# Patient Record
Sex: FEMALE | Race: ASIAN | NOT HISPANIC OR LATINO | URBAN - METROPOLITAN AREA
[De-identification: names, ages, dates, MRNs, and addresses within clinical notes are randomized per-mention and may not be internally consistent; named-entity substitution may affect disease eponyms.]

---

## 2016-12-28 RX ORDER — SODIUM CHLORIDE 9 MG/ML
1000 INJECTION, SOLUTION INTRAVENOUS
Qty: 0 | Refills: 0 | Status: DISCONTINUED | OUTPATIENT
Start: 2017-01-17 | End: 2017-01-17

## 2017-01-17 ENCOUNTER — OUTPATIENT (OUTPATIENT)
Dept: OUTPATIENT SERVICES | Facility: HOSPITAL | Age: 22
LOS: 1 days | Discharge: HOME | End: 2017-01-17

## 2017-01-17 VITALS
OXYGEN SATURATION: 98 % | WEIGHT: 106.04 LBS | SYSTOLIC BLOOD PRESSURE: 103 MMHG | HEART RATE: 55 BPM | DIASTOLIC BLOOD PRESSURE: 52 MMHG | HEIGHT: 64 IN | RESPIRATION RATE: 14 BRPM | TEMPERATURE: 98 F

## 2017-01-17 VITALS
HEART RATE: 55 BPM | RESPIRATION RATE: 17 BRPM | TEMPERATURE: 97 F | SYSTOLIC BLOOD PRESSURE: 110 MMHG | OXYGEN SATURATION: 97 % | DIASTOLIC BLOOD PRESSURE: 49 MMHG

## 2017-01-17 DIAGNOSIS — Q21.1 ATRIAL SEPTAL DEFECT: Chronic | ICD-10-CM

## 2017-01-17 DIAGNOSIS — Q89.9 CONGENITAL MALFORMATION, UNSPECIFIED: Chronic | ICD-10-CM

## 2017-01-17 DIAGNOSIS — N89.5 STRICTURE AND ATRESIA OF VAGINA: ICD-10-CM

## 2017-01-17 DIAGNOSIS — Z98.890 OTHER SPECIFIED POSTPROCEDURAL STATES: Chronic | ICD-10-CM

## 2017-01-17 NOTE — ASU DISCHARGE PLAN (ADULT/PEDIATRIC). - NURSING INSTRUCTIONS
you were given tylenol and toradol an NSAID in the operating room Please do not take any pain medication till after 4pm this afternoon

## 2017-01-19 ENCOUNTER — TRANSCRIPTION ENCOUNTER (OUTPATIENT)
Age: 22
End: 2017-01-19

## 2017-02-02 ENCOUNTER — APPOINTMENT (OUTPATIENT)
Dept: ALLERGY | Facility: CLINIC | Age: 22
End: 2017-02-02

## 2017-02-02 VITALS
WEIGHT: 113 LBS | HEIGHT: 64 IN | BODY MASS INDEX: 19.29 KG/M2 | HEART RATE: 68 BPM | RESPIRATION RATE: 14 BRPM | SYSTOLIC BLOOD PRESSURE: 110 MMHG | DIASTOLIC BLOOD PRESSURE: 80 MMHG

## 2017-02-07 ENCOUNTER — RECORD ABSTRACTING (OUTPATIENT)
Age: 22
End: 2017-02-07

## 2017-05-12 ENCOUNTER — OTHER (OUTPATIENT)
Age: 22
End: 2017-05-12

## 2017-05-30 ENCOUNTER — APPOINTMENT (OUTPATIENT)
Dept: PEDIATRIC SURGERY | Facility: CLINIC | Age: 22
End: 2017-05-30

## 2017-05-30 VITALS — WEIGHT: 119.71 LBS

## 2017-10-12 ENCOUNTER — APPOINTMENT (OUTPATIENT)
Dept: PEDIATRIC SURGERY | Facility: CLINIC | Age: 22
End: 2017-10-12
Payer: COMMERCIAL

## 2017-10-12 VITALS — WEIGHT: 130.73 LBS

## 2017-10-12 PROCEDURE — 99215 OFFICE O/P EST HI 40 MIN: CPT

## 2018-02-08 ENCOUNTER — APPOINTMENT (OUTPATIENT)
Dept: PEDIATRIC SURGERY | Facility: CLINIC | Age: 23
End: 2018-02-08
Payer: COMMERCIAL

## 2018-02-08 PROCEDURE — 99213 OFFICE O/P EST LOW 20 MIN: CPT

## 2018-08-21 ENCOUNTER — APPOINTMENT (OUTPATIENT)
Dept: INTERNAL MEDICINE | Facility: CLINIC | Age: 23
End: 2018-08-21
Payer: COMMERCIAL

## 2018-08-21 VITALS
HEIGHT: 64 IN | SYSTOLIC BLOOD PRESSURE: 100 MMHG | WEIGHT: 131 LBS | HEART RATE: 69 BPM | DIASTOLIC BLOOD PRESSURE: 60 MMHG | BODY MASS INDEX: 22.36 KG/M2 | OXYGEN SATURATION: 98 %

## 2018-08-21 DIAGNOSIS — Z86.19 PERSONAL HISTORY OF OTHER INFECTIOUS AND PARASITIC DISEASES: ICD-10-CM

## 2018-08-21 DIAGNOSIS — Z02.82 ENCOUNTER FOR ADOPTION SERVICES: ICD-10-CM

## 2018-08-21 DIAGNOSIS — Z87.2 PERSONAL HISTORY OF DISEASES OF THE SKIN AND SUBCUTANEOUS TISSUE: ICD-10-CM

## 2018-08-21 PROCEDURE — 99385 PREV VISIT NEW AGE 18-39: CPT

## 2018-08-21 RX ORDER — ATOMOXETINE HYDROCHLORIDE 100 MG/1
CAPSULE ORAL
Refills: 0 | Status: DISCONTINUED | COMMUNITY
End: 2018-08-21

## 2018-08-21 RX ORDER — HYDROXYZINE HYDROCHLORIDE 10 MG/1
10 TABLET ORAL
Qty: 30 | Refills: 0 | Status: COMPLETED | COMMUNITY
Start: 2018-03-16

## 2018-08-21 RX ORDER — BETAMETHASONE DIPROPIONATE 0.5 MG/G
0.05 CREAM, AUGMENTED TOPICAL TWICE DAILY
Qty: 60 | Refills: 1 | Status: DISCONTINUED | COMMUNITY
Start: 2017-02-02 | End: 2018-08-21

## 2018-08-21 RX ORDER — ELUXADOLINE 75 MG/1
75 TABLET, FILM COATED ORAL
Qty: 60 | Refills: 0 | Status: DISCONTINUED | COMMUNITY
Start: 2018-04-05

## 2018-08-21 RX ORDER — METOPROLOL SUCCINATE 100 MG/1
TABLET, EXTENDED RELEASE ORAL
Refills: 0 | Status: DISCONTINUED | COMMUNITY
End: 2018-08-21

## 2018-08-21 RX ORDER — HYOSCYAMINE SULFATE 0.12 MG/1
0.12 TABLET SUBLINGUAL
Qty: 60 | Refills: 0 | Status: COMPLETED | COMMUNITY
Start: 2018-04-30

## 2018-08-21 NOTE — HEALTH RISK ASSESSMENT
[No falls in past year] : Patient reported no falls in the past year [0] : 2) Feeling down, depressed, or hopeless: Not at all (0) [[id number]] : 0

## 2018-08-21 NOTE — PLAN
[FreeTextEntry1] : \par Ms. 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 ? IBS: recommend follow up with GI - pt likely needs to try varying regimens of Lomotil till she finds a good balance; recommend close flollow up Pt counseled to call MD if new symptoms develop or worsen. check CMP and TSH today\par \par 2. Congenital Heart Disease: followup with Cardiology: Dr. Aval[facility] at [facility]; no acute concerns; check lipids today; obtain copy of records from cardiology\par \par 3. STI Screening : HIV, Hep C/B, Syphilis, GC/Chlamydia today

## 2018-08-21 NOTE — COUNSELING
[Behavioral health counseling provided] : behavioral health  [Engage in a relaxing activity] : Engage in a relaxing activity [Patient motivation] : Patient motivation [Needs reinforcement, provided] : Patient needs reinforcement on understanding lifestyle changes and  the steps needed to achieve self management goals and reinforcement was provided

## 2018-08-21 NOTE — HISTORY OF PRESENT ILLNESS
[FreeTextEntry1] : CPE [de-identified] : \par Ms. 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 May - [place]\par Started on Lomotil for ? IBS - April 2018 - q8hr became constipated ; has tried q12h but returned to having diarrhea immense ; going back to q8hr caused constipation all day ; \par \par Cardiology: Dr. Avalos at [facility]; \par Recent chest pain - [facility] [place] - was told that she should work out more and eat better for chest pain; Cardiac MRI performed and EKG performed - within baseline; ? anxiety related as per cardiologist\par Today, Denies: chest pain, palpitations, headaches, shortness of breath (w/ or w/o exertion), vision or hearing changes, peripheral edema \par \par occupation: + ; volunteers at [employer]\par household: parents 2 brothers (1 with Asperger's) 2 dogs - american eskimo + mutt\par has a BF - not sexually active by penetration; is undergoing dilation with GYN however conflicted about it\par \par

## 2018-08-21 NOTE — PHYSICAL EXAM
[Normal] : normoactive bowel sounds, soft and nontender, no hepatosplenomegaly or masses appreciated [de-identified] : + chest scar from previous surgeries; slight murmur to RUSB

## 2018-08-22 ENCOUNTER — TRANSCRIPTION ENCOUNTER (OUTPATIENT)
Age: 23
End: 2018-08-22

## 2018-08-22 LAB
ALBUMIN SERPL ELPH-MCNC: 4.3 G/DL
ALP BLD-CCNC: 70 U/L
ALT SERPL-CCNC: <5 U/L
ANION GAP SERPL CALC-SCNC: 13 MMOL/L
AST SERPL-CCNC: 14 U/L
BILIRUB SERPL-MCNC: 0.5 MG/DL
BUN SERPL-MCNC: 11 MG/DL
C TRACH RRNA SPEC QL NAA+PROBE: NOT DETECTED
CALCIUM SERPL-MCNC: 9.6 MG/DL
CHLORIDE SERPL-SCNC: 103 MMOL/L
CHOLEST SERPL-MCNC: 177 MG/DL
CHOLEST/HDLC SERPL: 3.5 RATIO
CO2 SERPL-SCNC: 23 MMOL/L
CREAT SERPL-MCNC: 0.64 MG/DL
GLUCOSE SERPL-MCNC: 123 MG/DL
HBV SURFACE AB SER QL: NONREACTIVE
HBV SURFACE AG SER QL: NONREACTIVE
HCV AB SER QL: NONREACTIVE
HCV S/CO RATIO: 0.24 S/CO
HDLC SERPL-MCNC: 50 MG/DL
HIV1+2 AB SPEC QL IA.RAPID: NONREACTIVE
LDLC SERPL CALC-MCNC: 113 MG/DL
N GONORRHOEA RRNA SPEC QL NAA+PROBE: NOT DETECTED
POTASSIUM SERPL-SCNC: 3.9 MMOL/L
PROT SERPL-MCNC: 7.3 G/DL
SODIUM SERPL-SCNC: 139 MMOL/L
SOURCE AMPLIFICATION: NORMAL
T PALLIDUM AB SER QL IA: NEGATIVE
TRIGL SERPL-MCNC: 71 MG/DL
TSH SERPL-ACNC: 3.37 UIU/ML

## 2018-09-04 ENCOUNTER — TRANSCRIPTION ENCOUNTER (OUTPATIENT)
Age: 23
End: 2018-09-04

## 2018-12-12 LAB
M TB IFN-G BLD-IMP: NEGATIVE
QUANTIFERON TB PLUS MITOGEN MINUS NIL: 2.61 IU/ML
QUANTIFERON TB PLUS NIL: 0.02 IU/ML
QUANTIFERON TB PLUS TB1 MINUS NIL: 0.02 IU/ML
QUANTIFERON TB PLUS TB2 MINUS NIL: 0.03 IU/ML

## 2019-04-22 ENCOUNTER — FORM ENCOUNTER (OUTPATIENT)
Age: 24
End: 2019-04-22

## 2019-04-23 ENCOUNTER — OUTPATIENT (OUTPATIENT)
Dept: OUTPATIENT SERVICES | Facility: HOSPITAL | Age: 24
LOS: 1 days | End: 2019-04-23
Payer: COMMERCIAL

## 2019-04-23 ENCOUNTER — APPOINTMENT (OUTPATIENT)
Dept: RADIOLOGY | Facility: CLINIC | Age: 24
End: 2019-04-23
Payer: COMMERCIAL

## 2019-04-23 ENCOUNTER — APPOINTMENT (OUTPATIENT)
Dept: INTERNAL MEDICINE | Facility: CLINIC | Age: 24
End: 2019-04-23
Payer: COMMERCIAL

## 2019-04-23 VITALS
WEIGHT: 134 LBS | TEMPERATURE: 209.48 F | HEIGHT: 64 IN | SYSTOLIC BLOOD PRESSURE: 102 MMHG | OXYGEN SATURATION: 97 % | BODY MASS INDEX: 22.88 KG/M2 | DIASTOLIC BLOOD PRESSURE: 60 MMHG | HEART RATE: 68 BPM

## 2019-04-23 DIAGNOSIS — Z86.59 PERSONAL HISTORY OF OTHER MENTAL AND BEHAVIORAL DISORDERS: ICD-10-CM

## 2019-04-23 DIAGNOSIS — Q89.9 CONGENITAL MALFORMATION, UNSPECIFIED: Chronic | ICD-10-CM

## 2019-04-23 DIAGNOSIS — Z98.890 OTHER SPECIFIED POSTPROCEDURAL STATES: Chronic | ICD-10-CM

## 2019-04-23 DIAGNOSIS — J20.9 ACUTE BRONCHITIS, UNSPECIFIED: ICD-10-CM

## 2019-04-23 DIAGNOSIS — Q21.1 ATRIAL SEPTAL DEFECT: Chronic | ICD-10-CM

## 2019-04-23 PROCEDURE — 99215 OFFICE O/P EST HI 40 MIN: CPT

## 2019-04-23 PROCEDURE — 71046 X-RAY EXAM CHEST 2 VIEWS: CPT

## 2019-04-23 PROCEDURE — 71046 X-RAY EXAM CHEST 2 VIEWS: CPT | Mod: 26

## 2019-04-26 LAB
ALBUMIN SERPL ELPH-MCNC: 4.5 G/DL
ALP BLD-CCNC: 76 U/L
ALT SERPL-CCNC: 9 U/L
ANION GAP SERPL CALC-SCNC: 14 MMOL/L
AST SERPL-CCNC: 18 U/L
BASOPHILS # BLD AUTO: 0.03 K/UL
BASOPHILS NFR BLD AUTO: 0.5 %
BILIRUB SERPL-MCNC: 0.4 MG/DL
BUN SERPL-MCNC: 7 MG/DL
CALCIUM SERPL-MCNC: 9.6 MG/DL
CHLORIDE SERPL-SCNC: 108 MMOL/L
CMV IGG SERPL QL: <0.2 U/ML
CMV IGG SERPL-IMP: NEGATIVE
CO2 SERPL-SCNC: 22 MMOL/L
CREAT SERPL-MCNC: 0.69 MG/DL
EOSINOPHIL # BLD AUTO: 0.1 K/UL
EOSINOPHIL NFR BLD AUTO: 1.5 %
GLUCOSE SERPL-MCNC: 103 MG/DL
HCT VFR BLD CALC: 39.2 %
HETEROPH AB SER QL: NEGATIVE
HETEROPH AB SER QL: NEGATIVE
HGB BLD-MCNC: 12.6 G/DL
IMM GRANULOCYTES NFR BLD AUTO: 0.2 %
LYMPHOCYTES # BLD AUTO: 1.19 K/UL
LYMPHOCYTES NFR BLD AUTO: 17.9 %
MAN DIFF?: NORMAL
MCHC RBC-ENTMCNC: 28 PG
MCHC RBC-ENTMCNC: 32.1 GM/DL
MCV RBC AUTO: 87.1 FL
MONOCYTES # BLD AUTO: 0.41 K/UL
MONOCYTES NFR BLD AUTO: 6.2 %
NEUTROPHILS # BLD AUTO: 4.92 K/UL
NEUTROPHILS NFR BLD AUTO: 73.7 %
PLATELET # BLD AUTO: 287 K/UL
POTASSIUM SERPL-SCNC: 4.5 MMOL/L
PROT SERPL-MCNC: 7.1 G/DL
RBC # BLD: 4.5 M/UL
RBC # FLD: 12.2 %
SODIUM SERPL-SCNC: 144 MMOL/L
WBC # FLD AUTO: 6.66 K/UL

## 2019-04-26 NOTE — HISTORY OF PRESENT ILLNESS
[FreeTextEntry8] : \par Ms. Flor presents for acute symptoms:\par \par cough x 3 weeks - started off with normal URI like symptoms - after a stressful day 3 weeks ago; sore throat, painful swallowing; \par went to urgent care - was told that she did not have strep throat or influenza; started on Amoxicillin + Clauv Acid; \par Associated symptoms: migraines, "fogginess" , neck pain, nasal congestion, wheezing intermittent, myalgias, fatigue, some diarrhea with antibiotic use, near syncope; has multiple episodes of diarrhea and near accidents due to frequency; denies melena or hematochezia\par \par Prev hx of Mono - at the age of 12\par \par Denies fevers, chills, shortness of breath, chest pain, peripheral edema;\par OTC: Mucinex + Advil for pain relief\par \par PO Intake: some, able to tolerate PO intake

## 2019-04-26 NOTE — PLAN
[FreeTextEntry1] : \par \par \par Ms. Flor presents for acute symptoms:\par \par 1. Cough/Fatigue/URI: concerned about bronchitis vs. mono vs. CMV - check serologies and CXR to r/o PNA; [facility]; STOP abx. \par \par 2. GI: STOP abx ; start probiotics; try low FODMAP diet with small frequent meals; \par \par 3. RTO in 2 weeks or sooner - Pt counseled to call MD if new symptoms develop or worsen. \par \par 4. Infertility: endocrinology referral provided; pt is concerned about family planning for the future and finds it difficult sometimes to keep optomistic.

## 2019-04-26 NOTE — PHYSICAL EXAM
[Thin] : thin [Normal] : normal rate, regular rhythm, normal S1/S2, no murmur [Ulcer] : no ulcer [Mucositis] : no mucositis  [Thrush] : no thrush [de-identified] : erythema to posterior pharynx [de-identified] : mild wheezing to bases but otherwise good air movement no rhonchi

## 2019-07-24 ENCOUNTER — TRANSCRIPTION ENCOUNTER (OUTPATIENT)
Age: 24
End: 2019-07-24

## 2019-08-09 ENCOUNTER — APPOINTMENT (OUTPATIENT)
Dept: ENDOCRINOLOGY | Facility: CLINIC | Age: 24
End: 2019-08-09
Payer: COMMERCIAL

## 2019-08-09 VITALS
WEIGHT: 128 LBS | BODY MASS INDEX: 21.97 KG/M2 | OXYGEN SATURATION: 99 % | SYSTOLIC BLOOD PRESSURE: 88 MMHG | HEART RATE: 48 BPM | DIASTOLIC BLOOD PRESSURE: 42 MMHG

## 2019-08-09 PROCEDURE — 99204 OFFICE O/P NEW MOD 45 MIN: CPT

## 2019-08-26 ENCOUNTER — LABORATORY RESULT (OUTPATIENT)
Age: 24
End: 2019-08-26

## 2019-08-29 ENCOUNTER — TRANSCRIPTION ENCOUNTER (OUTPATIENT)
Age: 24
End: 2019-08-29

## 2019-09-11 ENCOUNTER — APPOINTMENT (OUTPATIENT)
Dept: ULTRASOUND IMAGING | Facility: CLINIC | Age: 24
End: 2019-09-11
Payer: COMMERCIAL

## 2019-09-11 ENCOUNTER — OUTPATIENT (OUTPATIENT)
Dept: OUTPATIENT SERVICES | Facility: HOSPITAL | Age: 24
LOS: 1 days | End: 2019-09-11
Payer: COMMERCIAL

## 2019-09-11 ENCOUNTER — OTHER (OUTPATIENT)
Age: 24
End: 2019-09-11

## 2019-09-11 DIAGNOSIS — Q89.9 CONGENITAL MALFORMATION, UNSPECIFIED: Chronic | ICD-10-CM

## 2019-09-11 DIAGNOSIS — Z00.8 ENCOUNTER FOR OTHER GENERAL EXAMINATION: ICD-10-CM

## 2019-09-11 DIAGNOSIS — Z98.890 OTHER SPECIFIED POSTPROCEDURAL STATES: Chronic | ICD-10-CM

## 2019-09-11 DIAGNOSIS — Q21.1 ATRIAL SEPTAL DEFECT: Chronic | ICD-10-CM

## 2019-09-11 LAB
17OHP SERPL-MCNC: 98 NG/DL
25(OH)D3 SERPL-MCNC: 27.4 NG/ML
ACTH SER-ACNC: 69.4 PG/ML
ANDROST SERPL-MCNC: 252 NG/DL
ANION GAP SERPL CALC-SCNC: 15 MMOL/L
BUN SERPL-MCNC: 12 MG/DL
CALCIUM SERPL-MCNC: 9.8 MG/DL
CALCIUM SERPL-MCNC: 9.8 MG/DL
CHLORIDE SERPL-SCNC: 104 MMOL/L
CO2 SERPL-SCNC: 22 MMOL/L
CORTIS SERPL-MCNC: 16.1 UG/DL
CREAT SERPL-MCNC: 0.67 MG/DL
DHEA-SULFATE, SERUM: 161 UG/DL
ESTRADIOL SERPL-MCNC: 55 PG/ML
FSH SERPL-MCNC: 5.4 IU/L
GH SERPL-MCNC: 0.07 NG/ML
GLUCOSE SERPL-MCNC: 91 MG/DL
IGF-1 INTERP: NORMAL
IGF-I BLD-MCNC: 203 NG/ML
LH SERPL-ACNC: 16.7 IU/L
PARATHYROID HORMONE INTACT: 27 PG/ML
POTASSIUM SERPL-SCNC: 4.5 MMOL/L
PROLACTIN SERPL-MCNC: 37.3 NG/ML
SODIUM SERPL-SCNC: 141 MMOL/L
T3RU NFR SERPL: 1 TBI
T4 FREE SERPL-MCNC: 1.1 NG/DL
T4 SERPL-MCNC: 6.3 UG/DL
TSH SERPL-ACNC: 3.52 UIU/ML

## 2019-09-11 PROCEDURE — 76856 US EXAM PELVIC COMPLETE: CPT

## 2019-09-11 PROCEDURE — 76856 US EXAM PELVIC COMPLETE: CPT | Mod: 26

## 2019-09-19 ENCOUNTER — OTHER (OUTPATIENT)
Age: 24
End: 2019-09-19

## 2019-09-19 LAB
17OHP SERPL-MCNC: 133 NG/DL
ACTH SER-ACNC: 70.3 PG/ML
ALDOSTERONE SERUM: 13.8 NG/DL
ANDROST SERPL-MCNC: 243 NG/DL
CORTIS SERPL-MCNC: 19.5 UG/DL
DHEA-SULFATE, SERUM: 202 UG/DL
ESTRADIOL SERPL-MCNC: 125 PG/ML
FSH SERPL-MCNC: 2.9 IU/L
LH SERPL-ACNC: 10.5 IU/L
PROLACTIN SERPL-MCNC: 40.6 NG/ML
RENIN ACTIVITY, PLASMA: 0.58 NG/ML/HR
SHBG SERPL-SCNC: 45 NMOL/L
TESTOST BND SERPL-MCNC: 2 PG/ML
TESTOST SERPL-MCNC: 25.4 NG/DL

## 2019-09-20 LAB — ANDROSTANOLONE SERPL-MCNC: 7.5 NG/DL

## 2019-09-23 ENCOUNTER — APPOINTMENT (OUTPATIENT)
Dept: PEDIATRIC MEDICAL GENETICS | Facility: CLINIC | Age: 24
End: 2019-09-23
Payer: COMMERCIAL

## 2019-09-23 VITALS — HEIGHT: 64.57 IN | WEIGHT: 127.87 LBS | BODY MASS INDEX: 21.56 KG/M2

## 2019-09-23 LAB
MONOMERIC PROLACTIN (ICMA)*: 31 NG/ML
PERCENT MACROPROLACTIN: 34 %
PROLACTIN, SERUM (ICMA)*: 47 NG/ML

## 2019-09-23 PROCEDURE — ZZZZZ: CPT

## 2019-09-23 PROCEDURE — 36415 COLL VENOUS BLD VENIPUNCTURE: CPT

## 2019-09-23 PROCEDURE — 99244 OFF/OP CNSLTJ NEW/EST MOD 40: CPT

## 2019-10-04 LAB — HIGH RESOLUTION CHROMOSOMAL MICROARRAY: NORMAL

## 2019-10-28 ENCOUNTER — TRANSCRIPTION ENCOUNTER (OUTPATIENT)
Age: 24
End: 2019-10-28

## 2019-10-30 ENCOUNTER — OTHER (OUTPATIENT)
Age: 24
End: 2019-10-30

## 2019-10-31 ENCOUNTER — APPOINTMENT (OUTPATIENT)
Dept: INTERNAL MEDICINE | Facility: CLINIC | Age: 24
End: 2019-10-31
Payer: COMMERCIAL

## 2019-10-31 VITALS
TEMPERATURE: 208.94 F | HEIGHT: 64.57 IN | BODY MASS INDEX: 22.43 KG/M2 | OXYGEN SATURATION: 98 % | HEART RATE: 74 BPM | DIASTOLIC BLOOD PRESSURE: 62 MMHG | WEIGHT: 133 LBS | SYSTOLIC BLOOD PRESSURE: 104 MMHG

## 2019-10-31 PROCEDURE — 99395 PREV VISIT EST AGE 18-39: CPT

## 2019-10-31 RX ORDER — ALBUTEROL SULFATE 90 UG/1
108 (90 BASE) AEROSOL, METERED RESPIRATORY (INHALATION)
Qty: 1 | Refills: 3 | Status: DISCONTINUED | COMMUNITY
Start: 2019-04-23 | End: 2019-10-31

## 2019-10-31 RX ORDER — SACCHAROMYCES BOULARDII 50 MG
250 CAPSULE ORAL TWICE DAILY
Qty: 16 | Refills: 0 | Status: DISCONTINUED | COMMUNITY
Start: 2019-04-23 | End: 2019-10-31

## 2019-10-31 RX ORDER — METOPROLOL SUCCINATE 25 MG/1
25 TABLET, EXTENDED RELEASE ORAL
Qty: 45 | Refills: 0 | Status: DISCONTINUED | COMMUNITY
Start: 2018-08-03 | End: 2019-10-31

## 2019-10-31 NOTE — HISTORY OF PRESENT ILLNESS
[FreeTextEntry1] : cpe\par forms for work, new job [de-identified] : 23 yo woman for wellness exam\par h/o congen heart ds s/p repair, cleft palate,  Mullerian anomaly-cloaca,( imperf anus/uterine and vag  agenesis)\par s/p sev surg/procedures over her lifetime starting as infant\par -currently has colostomy  (catheter tube,  not bag) ;vag  opening  was created from colonic tissue, previous discussions re  dilating.\par Gyn Dr. 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

## 2019-10-31 NOTE — ASSESSMENT
[FreeTextEntry1] : Young woman as a ambrocio.\par Overall, doing well.\par Encouraged to exercise more and eat healthy diet.\par check routine labs and quant gold (neg last yr)\par form preliminarily filled out.

## 2019-10-31 NOTE — PHYSICAL EXAM
[No Acute Distress] : no acute distress [Well Nourished] : well nourished [Well Developed] : well developed [Well-Appearing] : well-appearing [Normal Sclera/Conjunctiva] : normal sclera/conjunctiva [PERRL] : pupils equal round and reactive to light [EOMI] : extraocular movements intact [Normal Outer Ear/Nose] : the outer ears and nose were normal in appearance [Normal Oropharynx] : the oropharynx was normal [No JVD] : no jugular venous distention [No Lymphadenopathy] : no lymphadenopathy [Supple] : supple [Thyroid Normal, No Nodules] : the thyroid was normal and there were no nodules present [No Respiratory Distress] : no respiratory distress  [No Accessory Muscle Use] : no accessory muscle use [Clear to Auscultation] : lungs were clear to auscultation bilaterally [Normal Rate] : normal rate  [Regular Rhythm] : with a regular rhythm [Normal S1, S2] : normal S1 and S2 [No Murmur] : no murmur heard [No Carotid Bruits] : no carotid bruits [No Abdominal Bruit] : a ~M bruit was not heard ~T in the abdomen [No Varicosities] : no varicosities [Pedal Pulses Present] : the pedal pulses are present [No Edema] : there was no peripheral edema [No Palpable Aorta] : no palpable aorta [No Extremity Clubbing/Cyanosis] : no extremity clubbing/cyanosis [Normal Appearance] : normal in appearance [No Axillary Lymphadenopathy] : no axillary lymphadenopathy [Soft] : abdomen soft [Non Tender] : non-tender [Non-distended] : non-distended [No Masses] : no abdominal mass palpated [No HSM] : no HSM [Normal Bowel Sounds] : normal bowel sounds [Normal Posterior Cervical Nodes] : no posterior cervical lymphadenopathy [Normal Anterior Cervical Nodes] : no anterior cervical lymphadenopathy [No CVA Tenderness] : no CVA  tenderness [No Spinal Tenderness] : no spinal tenderness [No Joint Swelling] : no joint swelling [Grossly Normal Strength/Tone] : grossly normal strength/tone [No Rash] : no rash [Coordination Grossly Intact] : coordination grossly intact [No Focal Deficits] : no focal deficits [Normal Gait] : normal gait [Deep Tendon Reflexes (DTR)] : deep tendon reflexes were 2+ and symmetric [Normal Affect] : the affect was normal [Normal Insight/Judgement] : insight and judgment were intact [de-identified] : midline scar s/p cardiac surg [de-identified] : catheter emanating from umbilicus w port, taped onto abdo

## 2019-11-03 ENCOUNTER — TRANSCRIPTION ENCOUNTER (OUTPATIENT)
Age: 24
End: 2019-11-03

## 2019-11-03 LAB
ALBUMIN SERPL ELPH-MCNC: 5.2 G/DL
ALP BLD-CCNC: 75 U/L
ALT SERPL-CCNC: 11 U/L
ANION GAP SERPL CALC-SCNC: 14 MMOL/L
AST SERPL-CCNC: 15 U/L
BASOPHILS # BLD AUTO: 0.02 K/UL
BASOPHILS NFR BLD AUTO: 0.3 %
BILIRUB SERPL-MCNC: 0.4 MG/DL
BUN SERPL-MCNC: 13 MG/DL
C TRACH RRNA SPEC QL NAA+PROBE: NOT DETECTED
CALCIUM SERPL-MCNC: 10.2 MG/DL
CHLORIDE SERPL-SCNC: 103 MMOL/L
CHOLEST SERPL-MCNC: 201 MG/DL
CHOLEST/HDLC SERPL: 3.4 RATIO
CO2 SERPL-SCNC: 23 MMOL/L
CREAT SERPL-MCNC: 0.58 MG/DL
EOSINOPHIL # BLD AUTO: 0.08 K/UL
EOSINOPHIL NFR BLD AUTO: 1.2 %
ESTIMATED AVERAGE GLUCOSE: 100 MG/DL
GLUCOSE SERPL-MCNC: 88 MG/DL
HBA1C MFR BLD HPLC: 5.1 %
HBV SURFACE AB SER QL: NONREACTIVE
HBV SURFACE AG SER QL: NONREACTIVE
HCT VFR BLD CALC: 41.4 %
HCV AB SER QL: NONREACTIVE
HCV S/CO RATIO: 0.21 S/CO
HDLC SERPL-MCNC: 60 MG/DL
HGB BLD-MCNC: 13 G/DL
HIV1+2 AB SPEC QL IA.RAPID: NONREACTIVE
IMM GRANULOCYTES NFR BLD AUTO: 0.3 %
LDLC SERPL CALC-MCNC: 126 MG/DL
LYMPHOCYTES # BLD AUTO: 1.84 K/UL
LYMPHOCYTES NFR BLD AUTO: 27.4 %
M TB IFN-G BLD-IMP: NEGATIVE
MAN DIFF?: NORMAL
MCHC RBC-ENTMCNC: 28.4 PG
MCHC RBC-ENTMCNC: 31.4 GM/DL
MCV RBC AUTO: 90.6 FL
MONOCYTES # BLD AUTO: 0.48 K/UL
MONOCYTES NFR BLD AUTO: 7.1 %
N GONORRHOEA RRNA SPEC QL NAA+PROBE: NOT DETECTED
NEUTROPHILS # BLD AUTO: 4.28 K/UL
NEUTROPHILS NFR BLD AUTO: 63.7 %
PLATELET # BLD AUTO: 283 K/UL
POTASSIUM SERPL-SCNC: 4.5 MMOL/L
PROT SERPL-MCNC: 7.6 G/DL
QUANTIFERON TB PLUS MITOGEN MINUS NIL: 8.13 IU/ML
QUANTIFERON TB PLUS NIL: 0.04 IU/ML
QUANTIFERON TB PLUS TB1 MINUS NIL: 0.02 IU/ML
QUANTIFERON TB PLUS TB2 MINUS NIL: 0.03 IU/ML
RBC # BLD: 4.57 M/UL
RBC # FLD: 12.6 %
SODIUM SERPL-SCNC: 140 MMOL/L
SOURCE AMPLIFICATION: NORMAL
T PALLIDUM AB SER QL IA: NEGATIVE
TRIGL SERPL-MCNC: 73 MG/DL
TSH SERPL-ACNC: 3.67 UIU/ML
WBC # FLD AUTO: 6.72 K/UL

## 2019-11-25 ENCOUNTER — APPOINTMENT (OUTPATIENT)
Dept: MRI IMAGING | Facility: CLINIC | Age: 24
End: 2019-11-25
Payer: COMMERCIAL

## 2019-11-25 ENCOUNTER — OUTPATIENT (OUTPATIENT)
Dept: OUTPATIENT SERVICES | Facility: HOSPITAL | Age: 24
LOS: 1 days | End: 2019-11-25
Payer: COMMERCIAL

## 2019-11-25 DIAGNOSIS — Q89.9 CONGENITAL MALFORMATION, UNSPECIFIED: Chronic | ICD-10-CM

## 2019-11-25 DIAGNOSIS — Z98.890 OTHER SPECIFIED POSTPROCEDURAL STATES: Chronic | ICD-10-CM

## 2019-11-25 DIAGNOSIS — Q21.1 ATRIAL SEPTAL DEFECT: Chronic | ICD-10-CM

## 2019-11-25 DIAGNOSIS — Z00.8 ENCOUNTER FOR OTHER GENERAL EXAMINATION: ICD-10-CM

## 2019-11-25 PROCEDURE — 70553 MRI BRAIN STEM W/O & W/DYE: CPT

## 2019-11-25 PROCEDURE — A9585: CPT

## 2019-11-25 PROCEDURE — 70553 MRI BRAIN STEM W/O & W/DYE: CPT | Mod: 26

## 2019-12-04 ENCOUNTER — APPOINTMENT (OUTPATIENT)
Dept: INTERNAL MEDICINE | Facility: CLINIC | Age: 24
End: 2019-12-04

## 2019-12-09 ENCOUNTER — APPOINTMENT (OUTPATIENT)
Dept: ENDOCRINOLOGY | Facility: CLINIC | Age: 24
End: 2019-12-09

## 2019-12-16 NOTE — HISTORY OF PRESENT ILLNESS
[FreeTextEntry1] : Sandi is a 24 year old female with multiple birth defects including a congenital heart defect (ASD, VSD, PDA and coarctation of the aorta), imperforate anus, cleft palate and uterine agenesis.  Her heart defect and imperforate anus were surgically corrected in [place] prior to her adoption.  During one of Sandi's surgeries for imperforate anus, she was noted to have an absent uterus.  During another related surgery, she had surgery for the creation of a vaginal ostomy from colonic tissue.   Sandi's cleft palate was first noted when she was 4 years of age. At the time, her brother (who was also adopted) was being seen by a specialist due to his cleft lip.  This specialist happen to notice that Sandi had nasal speech.  She was diagnosed with a soft palate cleft and underwent surgery in July 1999.  \par \par Sandi was recently seen by an Endocrinologist for a work-up due to concern about hormonal mood swings.   She had normal pubertal development but amenorrhea.  An abdominal sonogram revealed normal ovaries.  Her work up was normal except for elevated ACTH and prolactin levels.  She is planning to schedule an MRI of the pituitary gland in the near future.  Sandi has bipolar illness and has attempted suicide on more than one occasion.  She has IBS.  She is followed by a gynecologist who performed vaginal dilation as needed.  She is also followed by her Cardiologist, Dr. Wilburn, on an annual basis.  A recent stress test was normal.\par \par Lane' developmental milestones were normal.  However, she had "significant" learning problems.  She had was in resource room  and had an IEP with smaller classes throughout her education.  She was able to graduate HS and is now taking some college classes.  \par \par

## 2019-12-16 NOTE — FAMILY HISTORY
[FreeTextEntry1] : Sandi's biological mother has 2 sons from another relationship who are reportedly normal. She has met her biological mother on 2 occasions and she appears normal.  No other information is known about the biological family history.  Sandi is of [place] descent.

## 2019-12-16 NOTE — CONSULT LETTER
[Consult Letter:] : I had the pleasure of evaluating your patient, [unfilled]. [Please see my note below.] : Please see my note below. [Consult Closing:] : Thank you very much for allowing me to participate in the care of this patient.  If you have any questions, please do not hesitate to contact me. [Sincerely,] : Sincerely, [DrBerta  ___] : Dr. RODRIGUEZ [Dear  ___] : Dear  [unfilled], [FreeTextEntry2] : Dr. Júnior Andrews, [FreeTextEntry3] : Kirk Park, DO\par Chief, Medical Genetics

## 2019-12-16 NOTE — PHYSICAL EXAM
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

## 2019-12-16 NOTE — BIRTH HISTORY
[FreeTextEntry1] : Sandi was the 2.8 kg product of a 38 week gestation, born by  in [place] to a  mother.  Very little is known about the pregnancy history since Sandi was given up to Foster Care at 2 months of age.  However, her mother is reported to have had anemia during the pregnancy and smoked 2 cigarettes per day.  At birth, Sandi was noted to have an imperforate anus and congenital heart defect (coarctation of aorta, VSD, ASD and PDA).  She had surgery for the imperforate anus shortly after birth and surgery for her heart defect in 1994.  She was eventually released from the hospital into [facility].  She was then adopted in the [place] at 1 year of age .   \par \par

## 2019-12-16 NOTE — REASON FOR VISIT
[Initial - Scheduled] : [unfilled]  is being seen for  ~M an initial scheduled visit [FreeTextEntry3] : she was referred for this initial genetic consultation from her Endocrinologist, Dr. Rafal Andrews for unexplained congenital anomalies (uterine agenesis, imperforate anus, cleft palate, and heart defect)\par \par

## 2019-12-16 NOTE — REVIEW OF SYSTEMS
[Nl] : Neurological [Smokers in Home] : no one in home smokes [FreeTextEntry3] : history of soft palate cleft [FreeTextEntry1] : uterine agensis [FreeTextEntry2] : elevated ACTH and prolactin

## 2019-12-17 ENCOUNTER — APPOINTMENT (OUTPATIENT)
Dept: ENDOCRINOLOGY | Facility: CLINIC | Age: 24
End: 2019-12-17
Payer: COMMERCIAL

## 2019-12-17 VITALS
BODY MASS INDEX: 22.09 KG/M2 | DIASTOLIC BLOOD PRESSURE: 61 MMHG | SYSTOLIC BLOOD PRESSURE: 100 MMHG | WEIGHT: 131 LBS | OXYGEN SATURATION: 98 % | HEART RATE: 76 BPM

## 2019-12-17 PROCEDURE — 99214 OFFICE O/P EST MOD 30 MIN: CPT

## 2019-12-24 LAB
ACTH SER-ACNC: 2.3 PG/ML
CORTIS SERPL-MCNC: 0.4 UG/DL

## 2020-02-25 ENCOUNTER — NON-APPOINTMENT (OUTPATIENT)
Age: 25
End: 2020-02-25

## 2020-02-25 ENCOUNTER — APPOINTMENT (OUTPATIENT)
Dept: OPHTHALMOLOGY | Facility: CLINIC | Age: 25
End: 2020-02-25
Payer: COMMERCIAL

## 2020-02-25 PROCEDURE — 92133 CPTRZD OPH DX IMG PST SGM ON: CPT

## 2020-02-25 PROCEDURE — 92004 COMPRE OPH EXAM NEW PT 1/>: CPT

## 2020-02-25 PROCEDURE — 92083 EXTENDED VISUAL FIELD XM: CPT

## 2020-03-06 ENCOUNTER — TRANSCRIPTION ENCOUNTER (OUTPATIENT)
Age: 25
End: 2020-03-06

## 2020-03-20 ENCOUNTER — APPOINTMENT (OUTPATIENT)
Dept: INTERNAL MEDICINE | Facility: CLINIC | Age: 25
End: 2020-03-20
Payer: COMMERCIAL

## 2020-03-20 PROCEDURE — 99442: CPT

## 2020-03-20 PROCEDURE — G2012 BRIEF CHECK IN BY MD/QHP: CPT

## 2020-04-09 NOTE — HISTORY OF PRESENT ILLNESS
[No] : no difficulty breathing [None] : none [Improved] : improved [Patient advised to contact office if symptoms progress] : Patient advised to contact office if symptoms progress [TextBox_8] : 6 [TextBox_28] : Chest pain have decreased

## 2020-04-09 NOTE — PLAN
[FreeTextEntry1] : \par Recommend temp checks q6hr\par Monitor breathing \par Take Tylenol after checking temperature if needed for muscle aches and fevers\par For cough/ sinus congestion: can take cough suppressant or cepacol lozenges; recommend nasal spray + Claritin / Allegra OTC;\par Maintain quarantine; frequent hand washing and hygiene\par Pt counseled to call MD if new symptoms develop or worsen. \par \par

## 2020-04-17 ENCOUNTER — APPOINTMENT (OUTPATIENT)
Dept: ENDOCRINOLOGY | Facility: CLINIC | Age: 25
End: 2020-04-17
Payer: COMMERCIAL

## 2020-04-17 PROCEDURE — 99214 OFFICE O/P EST MOD 30 MIN: CPT | Mod: 95

## 2020-04-17 NOTE — PHYSICAL EXAM
[Alert] : alert [Well Nourished] : well nourished [Healthy Appearance] : healthy appearance [No Acute Distress] : no acute distress [Well Developed] : well developed [Normal Voice/Communication] : normal voice communication [Normal Sclera/Conjunctiva] : normal sclera/conjunctiva [EOMI] : extra ocular movement intact [No Proptosis] : no proptosis [No Lid Lag] : no lid lag [Normal Outer Ear/Nose] : the ears and nose were normal in appearance [Normal Hearing] : hearing was normal [No Neck Mass] : no neck mass was observed [No Respiratory Distress] : no respiratory distress [No Accessory Muscle Use] : no accessory muscle use [Normal Rate] : heart rate was normal [Not Distended] : not distended [Soft] : abdomen soft [Normal Gait] : normal gait [No Rash] : no rash [No Skin Lesions] : no skin lesions [Cranial Nerves Intact] : cranial nerves 2-12 were intact [No Tremors] : no tremors [Oriented x3] : oriented to person, place, and time [Normal Affect] : the affect was normal [Normal Insight/Judgement] : insight and judgment were intact [Normal Mood] : the mood was normal

## 2020-04-17 NOTE — REVIEW OF SYSTEMS
[Irregular Menses] : irregular menses [As Noted in HPI] : as noted in HPI [Anxiety] : anxiety [Negative] : Heme/Lymph [All other systems negative] : All other systems negative [Fatigue] : no fatigue [Nausea] : no nausea [Constipation] : no constipation [Vomiting] : no vomiting [Diarrhea] : no diarrhea [Polyuria] : no polyuria [Joint Pain] : no joint pain [Muscle Weakness] : no muscle weakness [Myalgia] : no myalgia  [Acanthosis] : no acanthosis  [Hirsutism] : no hirsutism [Headaches] : no headaches

## 2020-04-17 NOTE — DATA REVIEWED
[FreeTextEntry1] : A brief thyroid ultrasound was done in the office 08/09/19 for recent episode of thyroiditis. The thyroid gland is normal in size, homogenous in all lobes with no nodules. No abnormal lymph nodes notes on exam

## 2020-04-17 NOTE — ASSESSMENT
[FreeTextEntry1] : 24 year old female with complex medical history of coarctation of aorta (VSD-ASD-PDA), imperforate anus and hymen s/p vaginoplasty, and appendicostomy, cleft palate closure, congenitally absent uterus here for f/u on hyperprolactinemia and high ACTH. Also with pituitary adenoma.\par \par Amenorrhea\par - Primary amenorrhea due to absence of uterus. 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 Hanson\par - Testosterone and 17-OHP is normal\par - Duhydrotestosterone is normal\par \par \par F/u annually for now.\par \par Stephy Andrews, DO\par \par

## 2020-04-17 NOTE — HISTORY OF PRESENT ILLNESS
[Home] : at home, [unfilled] , at the time of the visit. [Medical Office: ([facility])___] : at the medical office located in  [FreeTextEntry1] : 25 year old female for telehealth f/u on amenorrhea, mood instability, hyperprolactinemia, and high ACTH\par \par She states she does not have a uterus but has ovaries. No history of getting any periods in her life. No history of getting hormone replacement therapy.  She admits to having normal puberty, height attainment and breast development. \par 08/26/19: Prolactin 37.3, ACTH 69.4, AM Cortisol 16, HGH 0.07, Androstenedione 252, 17-OHP normal, TFTS normal\par Pelvic U/S 09/11/19: Congenitally absent uterus but has normal Left and Right ovary.\par Estradiol 55, LH 16.7, FSH 5.4\par 09/13/19: ACTH 70.3, AM Cortisol 19.5, Prolactin 40.6, Monomeric Prolactin 31, 34% macroprolactin, Androstenedione 343\par 10/31/19: TSH normal\par 11/25/19: MRI Brain showed pituitary gland approx 0.8 cm in height which is normal. Normal posterior pituitary bright spot. Pituitary stalk is centrally located and appears normal. Optic chiasm was normal. Vague area of decreased enhancement was seen involving the right pituitary gland. It measures 0.8 x 0.7 cm in size and could be a vague pituitary microadenoma.Spoke with radiologist who mentioned normal pituitary gland.\par \par Occ headaches. She has no h/o bone fractures. No headaches. No abdominal striae, no weight gain, no HTN or T2DM. No proximal myopathy. No breast pain or galactorrhea. Does not get periods due to congenitally absent uterus. Will f/u with gynecologist.\par She saw  and had normal chromosomal analysis on testing. \par \par Admits to following with therapist that has significantly helped with her mood symptoms and she does not have mood instability at this time.\par \par PMH: Anxiety, Congenital anomaly of the heart, imperforate anus (s/p repair with creation of vaginal ostomy from colonic wall), cleft palate, anorexia nervosa, uterine agenesis and still working on getting dilation done with GYN. H/o Bipolar disease and suicide attempt and had rehab done\par PSH: Coarctation of the aorta - VSD-ASD-PDA. Cloaca requiring colostomy and then reversal of procedure\par Soc Hx: No tobacco. Occ alcohol. once/week. Patient is adopted. Currently working as a  and \par Docs: Odalys Sharma for GYN

## 2020-04-24 ENCOUNTER — TRANSCRIPTION ENCOUNTER (OUTPATIENT)
Age: 25
End: 2020-04-24

## 2020-09-16 ENCOUNTER — APPOINTMENT (OUTPATIENT)
Dept: INTERNAL MEDICINE | Facility: CLINIC | Age: 25
End: 2020-09-16
Payer: COMMERCIAL

## 2020-09-16 DIAGNOSIS — Z87.898 PERSONAL HISTORY OF OTHER SPECIFIED CONDITIONS: ICD-10-CM

## 2020-09-16 DIAGNOSIS — T36.95XA TOXIC GASTROENTERITIS AND COLITIS: ICD-10-CM

## 2020-09-16 DIAGNOSIS — Z87.19 PERSONAL HISTORY OF OTHER DISEASES OF THE DIGESTIVE SYSTEM: ICD-10-CM

## 2020-09-16 DIAGNOSIS — K52.1 TOXIC GASTROENTERITIS AND COLITIS: ICD-10-CM

## 2020-09-16 PROCEDURE — 99442: CPT

## 2020-09-16 NOTE — PLAN
[FreeTextEntry1] : \par Ms. Flor is a 26 y/o female calling because has been recently depressed \par Recommend assistance & evaluation by psychiatrist given PTSD + Depression now; \par all questions answered, patient amenable to plan above \par \par Pt counseled to call MD if new symptoms develop or worsen. \par

## 2020-09-16 NOTE — HISTORY OF PRESENT ILLNESS
[Home] : at home, [unfilled] , at the time of the visit. [Verbal consent obtained from patient] : the patient, [unfilled] [Medical Office: ([facility])___] : at the medical office located in  [de-identified] : \par Ms. Flor is a 24 y/o female calling because has been recently depressed \par Seeing a therapist - who brought up that she may have PTSD\par Recommend that she see psychiatry for medication\par \par Previous suicide attempt - 10 years ago; was given anti-depressants but not sure which ones she would\par Increased stress with recently  & Occupation: counselor at [employer]\par \par Therapist: Sonja Chavez\par

## 2020-09-30 ENCOUNTER — APPOINTMENT (OUTPATIENT)
Dept: PSYCHIATRY | Facility: CLINIC | Age: 25
End: 2020-09-30
Payer: COMMERCIAL

## 2020-09-30 PROCEDURE — 99205 OFFICE O/P NEW HI 60 MIN: CPT

## 2020-11-04 ENCOUNTER — APPOINTMENT (OUTPATIENT)
Dept: PSYCHIATRY | Facility: CLINIC | Age: 25
End: 2020-11-04

## 2020-11-04 ENCOUNTER — APPOINTMENT (OUTPATIENT)
Dept: PSYCHIATRY | Facility: CLINIC | Age: 25
End: 2020-11-04
Payer: COMMERCIAL

## 2020-11-04 PROCEDURE — 99214 OFFICE O/P EST MOD 30 MIN: CPT

## 2020-11-04 PROCEDURE — 99072 ADDL SUPL MATRL&STAF TM PHE: CPT

## 2020-11-10 ENCOUNTER — APPOINTMENT (OUTPATIENT)
Dept: INTERNAL MEDICINE | Facility: CLINIC | Age: 25
End: 2020-11-10

## 2020-11-17 ENCOUNTER — TRANSCRIPTION ENCOUNTER (OUTPATIENT)
Age: 25
End: 2020-11-17

## 2020-12-02 ENCOUNTER — APPOINTMENT (OUTPATIENT)
Dept: PSYCHIATRY | Facility: CLINIC | Age: 25
End: 2020-12-02
Payer: COMMERCIAL

## 2020-12-02 PROCEDURE — 99072 ADDL SUPL MATRL&STAF TM PHE: CPT

## 2020-12-02 PROCEDURE — 99214 OFFICE O/P EST MOD 30 MIN: CPT

## 2020-12-02 RX ORDER — BUPROPION HYDROCHLORIDE 75 MG/1
75 TABLET, FILM COATED ORAL
Qty: 30 | Refills: 0 | Status: COMPLETED | COMMUNITY
Start: 2020-11-04 | End: 2020-12-02

## 2020-12-21 ENCOUNTER — TRANSCRIPTION ENCOUNTER (OUTPATIENT)
Age: 25
End: 2020-12-21

## 2020-12-21 ENCOUNTER — RX RENEWAL (OUTPATIENT)
Age: 25
End: 2020-12-21

## 2021-01-04 ENCOUNTER — APPOINTMENT (OUTPATIENT)
Dept: PSYCHIATRY | Facility: CLINIC | Age: 26
End: 2021-01-04

## 2021-01-04 ENCOUNTER — RX RENEWAL (OUTPATIENT)
Age: 26
End: 2021-01-04

## 2021-01-22 ENCOUNTER — APPOINTMENT (OUTPATIENT)
Dept: PSYCHIATRY | Facility: CLINIC | Age: 26
End: 2021-01-22
Payer: SELF-PAY

## 2021-01-22 PROCEDURE — 99214 OFFICE O/P EST MOD 30 MIN: CPT

## 2021-01-22 RX ORDER — DEXAMETHASONE 1 MG/1
1 TABLET ORAL
Qty: 1 | Refills: 0 | Status: COMPLETED | COMMUNITY
Start: 2019-12-17 | End: 2021-01-22

## 2021-02-12 ENCOUNTER — APPOINTMENT (OUTPATIENT)
Dept: PSYCHIATRY | Facility: CLINIC | Age: 26
End: 2021-02-12
Payer: SELF-PAY

## 2021-02-12 PROCEDURE — 99214 OFFICE O/P EST MOD 30 MIN: CPT

## 2021-04-26 ENCOUNTER — RX RENEWAL (OUTPATIENT)
Age: 26
End: 2021-04-26

## 2021-05-07 ENCOUNTER — APPOINTMENT (OUTPATIENT)
Dept: PSYCHIATRY | Facility: CLINIC | Age: 26
End: 2021-05-07
Payer: COMMERCIAL

## 2021-05-07 PROCEDURE — 99214 OFFICE O/P EST MOD 30 MIN: CPT | Mod: 95

## 2021-05-07 RX ORDER — ESCITALOPRAM OXALATE 10 MG/1
10 TABLET ORAL
Qty: 90 | Refills: 0 | Status: COMPLETED | COMMUNITY
Start: 2020-09-30 | End: 2021-05-07

## 2021-05-17 ENCOUNTER — RX RENEWAL (OUTPATIENT)
Age: 26
End: 2021-05-17

## 2021-05-20 LAB
PROLACTIN SERPL-MCNC: 35 NG/ML
T4 FREE SERPL-MCNC: 1.1 NG/DL
TSH SERPL-ACNC: 1.24 UIU/ML

## 2021-05-21 ENCOUNTER — TRANSCRIPTION ENCOUNTER (OUTPATIENT)
Age: 26
End: 2021-05-21

## 2021-05-28 ENCOUNTER — APPOINTMENT (OUTPATIENT)
Dept: INTERNAL MEDICINE | Facility: CLINIC | Age: 26
End: 2021-05-28
Payer: COMMERCIAL

## 2021-05-28 VITALS
HEIGHT: 64 IN | SYSTOLIC BLOOD PRESSURE: 103 MMHG | HEART RATE: 76 BPM | TEMPERATURE: 207.14 F | WEIGHT: 132 LBS | BODY MASS INDEX: 22.53 KG/M2 | DIASTOLIC BLOOD PRESSURE: 68 MMHG | OXYGEN SATURATION: 96 %

## 2021-05-28 PROCEDURE — 99213 OFFICE O/P EST LOW 20 MIN: CPT

## 2021-05-28 PROCEDURE — 99072 ADDL SUPL MATRL&STAF TM PHE: CPT

## 2021-05-28 NOTE — PHYSICAL EXAM
[Normal] : no acute distress, well nourished, well developed and well-appearing [de-identified] : no lesions on face; on left arm over posteriorolecranon area is an erythematous patch, with some areas of small vesicles and dry edges,, blanches; additional coin-sized lesions discretely diffusely over LE bilat,slightly raised, linda with pressure.

## 2021-05-28 NOTE — HISTORY OF PRESENT ILLNESS
[Rash (Location) ___] : rash on [unfilled] [FreeTextEntry8] : Pt develop rash like a "bug bite", started on left arm and spread, about 2 weeks ago. Went to , got medrol dose pack which she just finished yesterday. Did not help, and in fact rash is diffuse now, bilat legs, worse on left arm. Does not itch, although pt does think its like hives she has had in the past. In the middle of this, developed two days of exhaustion associated with a different rash on her face, which then resolved. Pt showed me pictures of initial arm rash (which looks like eczema) as well as facial rash (which was a diffuse morbilliform rash, without erythema, over entire face). Pt denies recent sun exposure. Started a FODMAPs diet April 19th, reports nothing new there, no new soaps, detergents, etc. \par Brother has ezcema.\par Pt denies allergies, asthma, ezcema.

## 2021-06-02 ENCOUNTER — RX RENEWAL (OUTPATIENT)
Age: 26
End: 2021-06-02

## 2021-06-03 ENCOUNTER — APPOINTMENT (OUTPATIENT)
Dept: INTERNAL MEDICINE | Facility: CLINIC | Age: 26
End: 2021-06-03
Payer: COMMERCIAL

## 2021-06-03 DIAGNOSIS — F41.9 ANXIETY DISORDER, UNSPECIFIED: ICD-10-CM

## 2021-06-03 PROCEDURE — 99213 OFFICE O/P EST LOW 20 MIN: CPT | Mod: 95

## 2021-06-03 NOTE — PHYSICAL EXAM
[Normal] : no acute distress, well nourished, well developed and well-appearing [No Respiratory Distress] : no respiratory distress  [No Edema] : there was no peripheral edema [No Rash] : no rash

## 2021-06-03 NOTE — PLAN
[FreeTextEntry1] : \par Ms. Flor presents with acute rash today\par Symptoms have now resolved; \par givne tenderness and nodular description, recommend labs: CBC, GINETTE, ESR, CRP\par since symptoms have resolved, it is possible labs will be normal\par however, if they recur, the labs may serve as a baseline; \par pt has appt with me next week for CPE - will get labs ahead of appt\par \par Pt counseled to call MD if new symptoms develop or worsen. \par all questions answered, patient amenable to plan above

## 2021-06-03 NOTE — HISTORY OF PRESENT ILLNESS
[Home] : at home, [unfilled] , at the time of the visit. [Medical Office: ([facility])___] : at the medical office located in  [FreeTextEntry8] : \par May 18: rash with bruising on shins; was given Medrol dose pack with a topical cream - no significant improvement on the symptoms; \par \par May 28: seen for persistent rash; bruising had resolved for 5 days but recurred; associated with significant pain and tendernss on walking - achiness of the calves; saw Derm with biopsy - awaiting results\par \par Now all symptoms are resolved; \par \par Rash described as bug bite looking - tender to touch; mostly on ankles, arm, legs, \par No recent travel \par No hiking, outdoor visits for the past 4 weeks; \par No fevers, chills, night sweats, palpitations, chest pain, \par \par COVID Vaccine January/February\par \par + migraine 2-3 weeks ago\par

## 2021-06-07 ENCOUNTER — NON-APPOINTMENT (OUTPATIENT)
Age: 26
End: 2021-06-07

## 2021-06-09 ENCOUNTER — LABORATORY RESULT (OUTPATIENT)
Age: 26
End: 2021-06-09

## 2021-06-10 ENCOUNTER — NON-APPOINTMENT (OUTPATIENT)
Age: 26
End: 2021-06-10

## 2021-06-11 ENCOUNTER — APPOINTMENT (OUTPATIENT)
Dept: PSYCHIATRY | Facility: CLINIC | Age: 26
End: 2021-06-11
Payer: SELF-PAY

## 2021-06-11 PROCEDURE — 99214 OFFICE O/P EST MOD 30 MIN: CPT

## 2021-06-11 RX ORDER — BUPROPION HYDROCHLORIDE 300 MG/1
300 TABLET, EXTENDED RELEASE ORAL DAILY
Qty: 90 | Refills: 2 | Status: ACTIVE | COMMUNITY
Start: 2020-12-02 | End: 1900-01-01

## 2021-06-11 RX ORDER — ESCITALOPRAM OXALATE 20 MG/1
20 TABLET ORAL DAILY
Qty: 90 | Refills: 2 | Status: ACTIVE | COMMUNITY
Start: 2021-02-12 | End: 1900-01-01

## 2021-06-11 RX ORDER — BUPROPION HYDROCHLORIDE 150 MG/1
150 TABLET, EXTENDED RELEASE ORAL
Qty: 30 | Refills: 2 | Status: COMPLETED | COMMUNITY
Start: 2021-05-17 | End: 2021-06-11

## 2021-06-15 ENCOUNTER — NON-APPOINTMENT (OUTPATIENT)
Age: 26
End: 2021-06-15

## 2021-06-16 ENCOUNTER — TRANSCRIPTION ENCOUNTER (OUTPATIENT)
Age: 26
End: 2021-06-16

## 2021-06-16 LAB
ALBUMIN SERPL ELPH-MCNC: 4.7 G/DL
ALP BLD-CCNC: 77 U/L
ALT SERPL-CCNC: 12 U/L
ANA SER IF-ACNC: NEGATIVE
ANION GAP SERPL CALC-SCNC: 10 MMOL/L
AST SERPL-CCNC: 14 U/L
BASOPHILS # BLD AUTO: 0.04 K/UL
BASOPHILS NFR BLD AUTO: 0.5 %
BILIRUB SERPL-MCNC: 0.2 MG/DL
BUN SERPL-MCNC: 9 MG/DL
CALCIUM SERPL-MCNC: 9.7 MG/DL
CHLORIDE SERPL-SCNC: 104 MMOL/L
CHOLEST SERPL-MCNC: 192 MG/DL
CO2 SERPL-SCNC: 24 MMOL/L
CREAT SERPL-MCNC: 0.8 MG/DL
CRP SERPL-MCNC: <3 MG/L
DSDNA AB SER-ACNC: <12 IU/ML
EOSINOPHIL # BLD AUTO: 0.15 K/UL
EOSINOPHIL NFR BLD AUTO: 1.8 %
ERYTHROCYTE [SEDIMENTATION RATE] IN BLOOD BY WESTERGREN METHOD: 14 MM/HR
GLUCOSE SERPL-MCNC: 102 MG/DL
HCT VFR BLD CALC: 39.7 %
HDLC SERPL-MCNC: 47 MG/DL
HETEROPH AB SER QL: NEGATIVE
HGB BLD-MCNC: 12.9 G/DL
IMM GRANULOCYTES NFR BLD AUTO: 0.4 %
LDLC SERPL CALC-MCNC: 128 MG/DL
LYMPHOCYTES # BLD AUTO: 1.52 K/UL
LYMPHOCYTES NFR BLD AUTO: 17.7 %
MAN DIFF?: NORMAL
MCHC RBC-ENTMCNC: 28.4 PG
MCHC RBC-ENTMCNC: 32.5 GM/DL
MCV RBC AUTO: 87.4 FL
MONOCYTES # BLD AUTO: 0.57 K/UL
MONOCYTES NFR BLD AUTO: 6.7 %
NEUTROPHILS # BLD AUTO: 6.26 K/UL
NEUTROPHILS NFR BLD AUTO: 72.9 %
NONHDLC SERPL-MCNC: 145 MG/DL
PLATELET # BLD AUTO: 318 K/UL
POTASSIUM SERPL-SCNC: 4.1 MMOL/L
PROT SERPL-MCNC: 7 G/DL
RBC # BLD: 4.54 M/UL
RBC # FLD: 12.3 %
SODIUM SERPL-SCNC: 138 MMOL/L
TRIGL SERPL-MCNC: 85 MG/DL
TSH SERPL-ACNC: 4.47 UIU/ML
WBC # FLD AUTO: 8.57 K/UL

## 2021-06-17 ENCOUNTER — APPOINTMENT (OUTPATIENT)
Dept: INTERNAL MEDICINE | Facility: CLINIC | Age: 26
End: 2021-06-17
Payer: COMMERCIAL

## 2021-06-17 VITALS
TEMPERATURE: 206.6 F | HEIGHT: 64 IN | OXYGEN SATURATION: 97 % | HEART RATE: 71 BPM | WEIGHT: 135 LBS | BODY MASS INDEX: 23.05 KG/M2 | SYSTOLIC BLOOD PRESSURE: 109 MMHG | DIASTOLIC BLOOD PRESSURE: 69 MMHG

## 2021-06-17 DIAGNOSIS — F41.8 OTHER SPECIFIED ANXIETY DISORDERS: ICD-10-CM

## 2021-06-17 PROCEDURE — 99072 ADDL SUPL MATRL&STAF TM PHE: CPT

## 2021-06-17 PROCEDURE — 99395 PREV VISIT EST AGE 18-39: CPT

## 2021-06-17 NOTE — PLAN
[FreeTextEntry1] : \par Ms. Flor is a 25 y/o female presents for CPE\par PMH: CHD, Congenital imperforate anus, colonic dysmotility, infertility, pituitary adenoma\par \par Autoimmune Disease: non specific; at this time she is asymptomatic; monitor for now; \par \par [place] Heart Disease: yearly followup with Cardiology; \par \par Pituitary Adenoma: yearly follow up with Endo; vision check 2x per year\par \par Reviewed her labs today - CBC, CMP, TSH, Lipids\par

## 2021-06-17 NOTE — HISTORY OF PRESENT ILLNESS
[de-identified] : \par Ms. Flor is a 25 y/o female presents for CPE\par PMH: CHD, Congenital imperforate anus, colonic dysmotility, infertility, pituitary adenoma\par \par Recent dx of ? autoimmune disease 2/2 skin biopsy of rash stating perivascular dermatitis\par \par 2020 - changed jobs; got ; \par Endo  - Dr. Andrews is managing pituitary adenoma\par \par No further rash; feeling well overall\par \par

## 2021-07-29 ENCOUNTER — APPOINTMENT (OUTPATIENT)
Dept: MRI IMAGING | Facility: CLINIC | Age: 26
End: 2021-07-29
Payer: COMMERCIAL

## 2021-07-29 ENCOUNTER — OUTPATIENT (OUTPATIENT)
Dept: OUTPATIENT SERVICES | Facility: HOSPITAL | Age: 26
LOS: 1 days | End: 2021-07-29
Payer: COMMERCIAL

## 2021-07-29 DIAGNOSIS — Q89.9 CONGENITAL MALFORMATION, UNSPECIFIED: Chronic | ICD-10-CM

## 2021-07-29 DIAGNOSIS — Q21.1 ATRIAL SEPTAL DEFECT: Chronic | ICD-10-CM

## 2021-07-29 DIAGNOSIS — Z00.8 ENCOUNTER FOR OTHER GENERAL EXAMINATION: ICD-10-CM

## 2021-07-29 DIAGNOSIS — Z98.890 OTHER SPECIFIED POSTPROCEDURAL STATES: Chronic | ICD-10-CM

## 2021-07-29 PROCEDURE — 70553 MRI BRAIN STEM W/O & W/DYE: CPT | Mod: 26

## 2021-07-29 PROCEDURE — A9585: CPT

## 2021-07-29 PROCEDURE — 70553 MRI BRAIN STEM W/O & W/DYE: CPT

## 2021-08-08 ENCOUNTER — TRANSCRIPTION ENCOUNTER (OUTPATIENT)
Age: 26
End: 2021-08-08

## 2021-08-31 ENCOUNTER — APPOINTMENT (OUTPATIENT)
Dept: RHEUMATOLOGY | Facility: CLINIC | Age: 26
End: 2021-08-31
Payer: COMMERCIAL

## 2021-08-31 VITALS
HEART RATE: 68 BPM | HEIGHT: 64 IN | DIASTOLIC BLOOD PRESSURE: 77 MMHG | BODY MASS INDEX: 22.71 KG/M2 | TEMPERATURE: 207.14 F | WEIGHT: 133 LBS | OXYGEN SATURATION: 97 % | SYSTOLIC BLOOD PRESSURE: 116 MMHG

## 2021-08-31 PROCEDURE — 99203 OFFICE O/P NEW LOW 30 MIN: CPT | Mod: GC

## 2021-09-09 ENCOUNTER — APPOINTMENT (OUTPATIENT)
Dept: ENDOCRINOLOGY | Facility: CLINIC | Age: 26
End: 2021-09-09
Payer: COMMERCIAL

## 2021-09-09 VITALS
TEMPERATURE: 208.04 F | SYSTOLIC BLOOD PRESSURE: 120 MMHG | HEART RATE: 64 BPM | DIASTOLIC BLOOD PRESSURE: 62 MMHG | WEIGHT: 138 LBS | BODY MASS INDEX: 23.69 KG/M2 | OXYGEN SATURATION: 98 %

## 2021-09-09 PROCEDURE — 99213 OFFICE O/P EST LOW 20 MIN: CPT

## 2021-09-24 ENCOUNTER — APPOINTMENT (OUTPATIENT)
Dept: PULMONOLOGY | Facility: CLINIC | Age: 26
End: 2021-09-24
Payer: COMMERCIAL

## 2021-09-24 VITALS
BODY MASS INDEX: 23.9 KG/M2 | HEART RATE: 70 BPM | HEIGHT: 64 IN | SYSTOLIC BLOOD PRESSURE: 110 MMHG | OXYGEN SATURATION: 97 % | WEIGHT: 140 LBS | DIASTOLIC BLOOD PRESSURE: 50 MMHG

## 2021-09-24 PROCEDURE — 99204 OFFICE O/P NEW MOD 45 MIN: CPT

## 2021-09-24 NOTE — CONSULT LETTER
[FreeTextEntry1] : Dear ,\par \par I had the pleasure of evaluating your patient, ROSS DIGGS today in pulmonary consultation.  Please refer to my attached note for my findings and recommendations.\par \par \par Thank you for allowing me to participate in the care of your patient, please feel free to call with any questions or concerns.\par \par \par Sincerely,\par \par Stephanie Prince MD\par [facility] \par [facility]\par \par

## 2021-09-24 NOTE — ASSESSMENT
[FreeTextEntry1] : suggest keeping sleep diary\par Improved sleep hygiene\par melatonin 30min before wants to be asleep\par no electronics 1 hr before bedtime\par only use bed for sleeping, do not sit in bed reading.

## 2021-10-01 ENCOUNTER — APPOINTMENT (OUTPATIENT)
Dept: NEUROLOGY | Facility: CLINIC | Age: 26
End: 2021-10-01

## 2021-10-07 ENCOUNTER — APPOINTMENT (OUTPATIENT)
Dept: RHEUMATOLOGY | Facility: CLINIC | Age: 26
End: 2021-10-07

## 2021-12-07 ENCOUNTER — TRANSCRIPTION ENCOUNTER (OUTPATIENT)
Age: 26
End: 2021-12-07

## 2021-12-10 ENCOUNTER — APPOINTMENT (OUTPATIENT)
Dept: PSYCHIATRY | Facility: CLINIC | Age: 26
End: 2021-12-10

## 2021-12-13 ENCOUNTER — APPOINTMENT (OUTPATIENT)
Dept: PSYCHIATRY | Facility: CLINIC | Age: 26
End: 2021-12-13
Payer: SELF-PAY

## 2021-12-13 PROCEDURE — 99214 OFFICE O/P EST MOD 30 MIN: CPT | Mod: 95

## 2022-01-03 LAB — SARS-COV-2 N GENE NPH QL NAA+PROBE: NOT DETECTED

## 2022-01-19 ENCOUNTER — TRANSCRIPTION ENCOUNTER (OUTPATIENT)
Age: 27
End: 2022-01-19

## 2022-01-31 ENCOUNTER — NON-APPOINTMENT (OUTPATIENT)
Age: 27
End: 2022-01-31

## 2022-02-17 LAB
CORTIS SERPL-MCNC: 16.3 UG/DL
MONOMERIC PROLACTIN (ICMA)*: 28.7 NG/ML
PERCENT MACROPROLACTIN: 13 %
PROLACTIN SERPL-MCNC: 34 NG/ML
PROLACTIN, SERUM (ICMA)*: 32.8 NG/ML
T4 FREE SERPL-MCNC: 1 NG/DL
TSH SERPL-ACNC: 0.91 UIU/ML

## 2022-03-25 ENCOUNTER — TRANSCRIPTION ENCOUNTER (OUTPATIENT)
Age: 27
End: 2022-03-25

## 2022-04-01 ENCOUNTER — APPOINTMENT (OUTPATIENT)
Dept: PSYCHIATRY | Facility: CLINIC | Age: 27
End: 2022-04-01

## 2022-06-23 ENCOUNTER — APPOINTMENT (OUTPATIENT)
Dept: INTERNAL MEDICINE | Facility: CLINIC | Age: 27
End: 2022-06-23

## 2022-07-01 ENCOUNTER — APPOINTMENT (OUTPATIENT)
Dept: INTERNAL MEDICINE | Facility: CLINIC | Age: 27
End: 2022-07-01

## 2022-07-01 VITALS
WEIGHT: 149 LBS | BODY MASS INDEX: 25.44 KG/M2 | DIASTOLIC BLOOD PRESSURE: 71 MMHG | OXYGEN SATURATION: 98 % | HEIGHT: 64 IN | HEART RATE: 64 BPM | SYSTOLIC BLOOD PRESSURE: 114 MMHG | TEMPERATURE: 207.5 F

## 2022-07-01 DIAGNOSIS — Z00.00 ENCOUNTER FOR GENERAL ADULT MEDICAL EXAMINATION W/OUT ABNORMAL FINDINGS: ICD-10-CM

## 2022-07-01 PROCEDURE — 99395 PREV VISIT EST AGE 18-39: CPT

## 2022-07-01 NOTE — ASSESSMENT
[FreeTextEntry1] : HCM:\par - Cholesterol/a1c, vitamin D, CBC, CMP\par - Will obtain usual Endo labs given poss loss of insurance\par - MRI pituitary (normally ordered by Endo) given poss loss of insurance\par - COVID 1/28, 2/18/21, 2/18/22\par - PCV20 recommended

## 2022-07-01 NOTE — REVIEW OF SYSTEMS
[Fever] : no fever [Chills] : no chills [Chest Pain] : no chest pain [Shortness Of Breath] : no shortness of breath [Headache] : no headache

## 2022-07-01 NOTE — HISTORY OF PRESENT ILLNESS
[de-identified] : Sandi Jett is a 28 y/o F with PMhx of ASD/VSD/coarctation of aorta s/p repair, congenital imperforate anus c/b colonic dysmotility, pituitary adenoma, and anxiety/depression who presents for CPE.\par \par Last saw Dr. Wilson in Jun 2021, since then:\par - Saw Rheum for poss CTD, who felt CTD was unlikely\par -  eval not very fruitful due to being adopted\par - Neuro-optho per Dr. Andrews for visual field eval. Recommended follow in 1 year (around now)\par - Dermatologist for acne on clindamycin\par - Gynecology and urogynecology [facility] doing pelvic floor therapy\par - Cardiology [facility], due to see next week, once 1 year\par - A lot of stress due to separation from , concerned her insurance (using insurance from her 's job) will lapse

## 2022-07-01 NOTE — PHYSICAL EXAM
[No Acute Distress] : no acute distress [Well-Appearing] : well-appearing [Normal Sclera/Conjunctiva] : normal sclera/conjunctiva [PERRL] : pupils equal round and reactive to light [EOMI] : extraocular movements intact [Normal Outer Ear/Nose] : the outer ears and nose were normal in appearance [No Lymphadenopathy] : no lymphadenopathy [Thyroid Normal, No Nodules] : the thyroid was normal and there were no nodules present [No Respiratory Distress] : no respiratory distress  [No Accessory Muscle Use] : no accessory muscle use [Clear to Auscultation] : lungs were clear to auscultation bilaterally [Normal Rate] : normal rate  [Regular Rhythm] : with a regular rhythm [Normal S1, S2] : normal S1 and S2 [No Edema] : there was no peripheral edema [Soft] : abdomen soft [Non Tender] : non-tender [Non-distended] : non-distended [No Rash] : no rash [Normal Insight/Judgement] : insight and judgment were intact [de-identified] : Asymmetric palate and surgical scar noted [de-identified] : Surgical scar noted [de-identified] : No facial asymmetry. Strength equal in the upper and lower extremities. Finger-to-nose normal, heel-to-shin normal

## 2022-07-01 NOTE — HEALTH RISK ASSESSMENT
[Never] : Never [2 - 4 times a month (2 pts)] : 2-4 times a month (2 points) [1 or 2 (0 pts)] : 1 or 2 (0 points) [Never (0 pts)] : Never (0 points) [Yes] : In the past 12 months have you used drugs other than those required for medical reasons? Yes [Medical reason not done] : Medical reason not done [With Family] : lives with family [Employed] : employed [] :  [Feels Safe at Home] : Feels safe at home [Fully functional (bathing, dressing, toileting, transferring, walking, feeding)] : Fully functional (bathing, dressing, toileting, transferring, walking, feeding) [Fully functional (using the telephone, shopping, preparing meals, housekeeping, doing laundry, using] : Fully functional and needs no help or supervision to perform IADLs (using the telephone, shopping, preparing meals, housekeeping, doing laundry, using transportation, managing medications and managing finances) [Reports changes in vision] : Reports changes in vision [Reports changes in dental health] : Reports changes in dental health [Audit-CScore] : 2 [de-identified] : No exercise [de-identified] : Does eat healthy [de-identified] : Under treatment for depression [Reports changes in hearing] : Reports no changes in hearing [FreeTextEntry2] :

## 2022-07-13 ENCOUNTER — NON-APPOINTMENT (OUTPATIENT)
Age: 27
End: 2022-07-13

## 2022-07-13 LAB
25(OH)D3 SERPL-MCNC: 35 NG/ML
ACTH SER-ACNC: 44.2 PG/ML
ALBUMIN SERPL ELPH-MCNC: 4.6 G/DL
ALP BLD-CCNC: 74 U/L
ALT SERPL-CCNC: 9 U/L
ANION GAP SERPL CALC-SCNC: 12 MMOL/L
AST SERPL-CCNC: 12 U/L
BASOPHILS # BLD AUTO: 0.03 K/UL
BASOPHILS NFR BLD AUTO: 0.3 %
BILIRUB SERPL-MCNC: 0.4 MG/DL
BUN SERPL-MCNC: 8 MG/DL
CALCIUM SERPL-MCNC: 9.7 MG/DL
CHLORIDE SERPL-SCNC: 104 MMOL/L
CHOLEST SERPL-MCNC: 212 MG/DL
CO2 SERPL-SCNC: 23 MMOL/L
CORTIS SERPL-MCNC: 14.2 UG/DL
CREAT SERPL-MCNC: 0.8 MG/DL
EGFR: 103 ML/MIN/1.73M2
EOSINOPHIL # BLD AUTO: 0.14 K/UL
EOSINOPHIL NFR BLD AUTO: 1.6 %
ESTIMATED AVERAGE GLUCOSE: 105 MG/DL
GLUCOSE SERPL-MCNC: 93 MG/DL
HBA1C MFR BLD HPLC: 5.3 %
HCT VFR BLD CALC: 40.3 %
HDLC SERPL-MCNC: 46 MG/DL
HGB BLD-MCNC: 13 G/DL
IMM GRANULOCYTES NFR BLD AUTO: 0.5 %
LDLC SERPL CALC-MCNC: 145 MG/DL
LYMPHOCYTES # BLD AUTO: 1.81 K/UL
LYMPHOCYTES NFR BLD AUTO: 20.8 %
MAN DIFF?: NORMAL
MCHC RBC-ENTMCNC: 28.8 PG
MCHC RBC-ENTMCNC: 32.3 GM/DL
MCV RBC AUTO: 89.4 FL
MONOCYTES # BLD AUTO: 0.56 K/UL
MONOCYTES NFR BLD AUTO: 6.4 %
NEUTROPHILS # BLD AUTO: 6.12 K/UL
NEUTROPHILS NFR BLD AUTO: 70.4 %
NONHDLC SERPL-MCNC: 165 MG/DL
PLATELET # BLD AUTO: 307 K/UL
POTASSIUM SERPL-SCNC: 4 MMOL/L
PROLACTIN SERPL-MCNC: 0.7 NG/ML
PROLACTIN SERPL-MCNC: 0.7 NG/ML
PROT SERPL-MCNC: 7.2 G/DL
RBC # BLD: 4.51 M/UL
RBC # FLD: 13.1 %
SODIUM SERPL-SCNC: 139 MMOL/L
T4 FREE SERPL-MCNC: 1 NG/DL
TRIGL SERPL-MCNC: 99 MG/DL
TSH SERPL-ACNC: 5.79 UIU/ML
WBC # FLD AUTO: 8.7 K/UL

## 2022-07-17 ENCOUNTER — NON-APPOINTMENT (OUTPATIENT)
Age: 27
End: 2022-07-17

## 2022-07-18 LAB
MONOMERIC PROLACTIN (ICMA)*: 0.6 NG/ML
PERCENT MACROPROLACTIN: 10 %
PROLACTIN, SERUM (ICMA)*: 0.67 NG/ML

## 2022-07-19 ENCOUNTER — APPOINTMENT (OUTPATIENT)
Dept: ENDOCRINOLOGY | Facility: CLINIC | Age: 27
End: 2022-07-19

## 2022-08-15 ENCOUNTER — APPOINTMENT (OUTPATIENT)
Dept: INTERNAL MEDICINE | Facility: CLINIC | Age: 27
End: 2022-08-15

## 2022-09-13 ENCOUNTER — NON-APPOINTMENT (OUTPATIENT)
Age: 27
End: 2022-09-13

## 2022-09-15 ENCOUNTER — NON-APPOINTMENT (OUTPATIENT)
Age: 27
End: 2022-09-15

## 2022-09-16 ENCOUNTER — APPOINTMENT (OUTPATIENT)
Dept: INTERNAL MEDICINE | Facility: CLINIC | Age: 27
End: 2022-09-16

## 2022-09-16 VITALS
OXYGEN SATURATION: 98 % | TEMPERATURE: 208.94 F | HEART RATE: 92 BPM | SYSTOLIC BLOOD PRESSURE: 106 MMHG | DIASTOLIC BLOOD PRESSURE: 63 MMHG

## 2022-09-16 PROCEDURE — 99214 OFFICE O/P EST MOD 30 MIN: CPT

## 2022-09-16 NOTE — PHYSICAL EXAM
[No Acute Distress] : no acute distress [Well Nourished] : well nourished [Well Developed] : well developed [Normal Outer Ear/Nose] : the outer ears and nose were normal in appearance [No Lymphadenopathy] : no lymphadenopathy [No Respiratory Distress] : no respiratory distress  [de-identified] : asymmetric oropharynx with scar, but no exudate seen [de-identified] : Tenderness to palpation over the greater trochanteric bursa. No pain elicited with hip flexion, internal/external rotation.

## 2022-09-16 NOTE — HISTORY OF PRESENT ILLNESS
[FreeTextEntry8] : Sandi Haynes is a 26 y/o F with PMhx of ASD/VSD/coarctation of aorta s/p repair, congenital imperforate anus c/b colonic dysmotility, pituitary adenoma, and anxiety/depression who presents for multiple complaints.\par \par Sx started 9/9 whilte she was traveling in [place]. Started with HA feels like a wooshing sound, difficult focusing eyes. Pounding sensation. Resolved with sleep. However, then developed neck pain, imbalanced feeling matthew with forward leaning. Did have some sore throat with white spots. 9/10 noted hip pain of left hip, worse towards end of day. Hip pain feels like a soreness/ache. Located over left lateral hip with radiating to glutes. 9/11 flew home from [place], and developed lower abdominal pain (suprbapubic/RLQ) present for 3 days. On 9/13, developed shooting pain on the inner leg which has spontaneously resolved. \par \par Neck pain, throat spots, and lower abd pain have resolved. Hip pain still mildly present. Overall, hip pain is unchanged to perhaps slightly improved, worse with walking.\par \par Seen in Urgent Care this AM, took a rapid COVID test which was negative.\par \par Concerned because she had a similar presentation last year (viral illness followed a constellation of symptoms including joint pain and rash) which prompted a Rheum w/u. She was told by her PMD she needs to have tests repeated during a flare?

## 2022-09-22 ENCOUNTER — APPOINTMENT (OUTPATIENT)
Dept: ENDOCRINOLOGY | Facility: CLINIC | Age: 27
End: 2022-09-22

## 2022-09-30 ENCOUNTER — APPOINTMENT (OUTPATIENT)
Dept: ENDOCRINOLOGY | Facility: CLINIC | Age: 27
End: 2022-09-30

## 2022-09-30 VITALS
SYSTOLIC BLOOD PRESSURE: 106 MMHG | HEART RATE: 71 BPM | BODY MASS INDEX: 24.41 KG/M2 | DIASTOLIC BLOOD PRESSURE: 70 MMHG | TEMPERATURE: 206.96 F | WEIGHT: 143 LBS | HEIGHT: 64 IN | OXYGEN SATURATION: 99 %

## 2022-09-30 DIAGNOSIS — R79.89 OTHER SPECIFIED ABNORMAL FINDINGS OF BLOOD CHEMISTRY: ICD-10-CM

## 2022-09-30 PROCEDURE — 99215 OFFICE O/P EST HI 40 MIN: CPT

## 2022-09-30 NOTE — ASSESSMENT
[FreeTextEntry1] : Patient is a 27 F with history of Mullerian agenesis, microprolactinemia, subclinical hypothyroidism here for follow up. \par \par # Microprolactinoma \par - Previously had blood work which showed 09/13/19: ACTH 70.3, AM Cortisol 19.5, Prolactin 40.6, Monomeric Prolactin 31, 34% macroprolactin, Androstenedione 343\par - 11/25/19: MRI Brain showed pituitary gland approx  0.8 x 0.7 cm in size \par 07/29/21: Pituitary Gland adenoma minimally changed in size. 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\[place] [address]\par Tel [phone]\par Fax [phone]

## 2022-09-30 NOTE — HISTORY OF PRESENT ILLNESS
[FreeTextEntry1] : Patient is a 27 F with history of Mullerian agenesis, microprolactinemia, subclinical hypothyroidism here for follow up. Last seen Dr. Andrews in 09/2021. \par \par PMH: Anxiety, Congenital anomaly of the heart, imperforate anus (s/p repair with creation of vaginal ostomy from colonic wall), cleft palate, anorexia nervosa, uterine agenesis. H/o Bipolar disease and suicide attempt and had rehab done\par PSH: Coarctation of the aorta - VSD-ASD-PDA. Cloaca requiring colostomy and then reversal of procedure. In the process of looking for new GI doctor. \par Soc Hx: No tobacco. Occ alcohol. once/week. Patient is adopted. Currently working as a  at a dentist office. 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 No history of getting any periods in her life. No history of getting hormone replacement therapy. She admits to having normal puberty, height attainment and breast development. \par 08/26/19: Prolactin 37.3, ACTH 69.4, AM Cortisol 16, HGH 0.07, Androstenedione 252, 17-OHP normal, TFTS normal\par Pelvic U/S 09/11/19: Congenitally absent uterus but has normal Left and Right ovary.\par She saw  and had normal chromosomal analysis testing, was normal. \par \par # Microprolactinoma \par - Previously had blood work which showed 09/13/19: ACTH 70.3, AM Cortisol 19.5, Prolactin 40.6, Monomeric Prolactin 31, 34% macroprolactin, Androstenedione 343\par - 11/25/19: MRI Brain showed pituitary gland approx 0.8 cm in height which is normal. Normal posterior pituitary bright spot. Pituitary stalk is centrally located and appears normal. Optic chiasm was normal. Vague area of decreased enhancement was seen involving the right pituitary gland. It measures 0.8 x 0.7 cm in size and could be a vague pituitary microadenoma.Spoke with radiologist who mentioned normal pituitary gland.\par 07/29/21: Pituitary Gland adenoma minimally changed in size. 8 mm in size \par - Started on Cabergoline 0.25 mg twice weekly since 02/2022\par 07/08/2022: pituitary adenoma 3 mm (decrease in size from prior)\par \par # Subclinical hypothyroidism \par TSH elevated at 5.6 with normal FT4\par No obvious symptoms of hypothyroidism \par \par # HDL \par - LDL elevated along with non HDL \par - Not on medications \par \par 9/30 visit: Occ headaches and occasional blurry vision. She has no h/o bone fractures. No headaches. No abdominal striae, no weight gain, no HTN or T2DM. No proximal myopathy. No breast pain or galactorrhea. Does not get periods due to congenitally absent uterus. On carbergline 0.25 mg twice per week (on Carbergoline since 02/2022). 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 Occasional fatigue. \par

## 2022-10-10 ENCOUNTER — RESULT REVIEW (OUTPATIENT)
Age: 27
End: 2022-10-10

## 2022-10-12 ENCOUNTER — APPOINTMENT (OUTPATIENT)
Dept: ULTRASOUND IMAGING | Facility: CLINIC | Age: 27
End: 2022-10-12

## 2022-10-12 ENCOUNTER — OUTPATIENT (OUTPATIENT)
Dept: OUTPATIENT SERVICES | Facility: HOSPITAL | Age: 27
LOS: 1 days | End: 2022-10-12
Payer: COMMERCIAL

## 2022-10-12 ENCOUNTER — APPOINTMENT (OUTPATIENT)
Dept: RADIOLOGY | Facility: IMAGING CENTER | Age: 27
End: 2022-10-12

## 2022-10-12 DIAGNOSIS — Z98.890 OTHER SPECIFIED POSTPROCEDURAL STATES: Chronic | ICD-10-CM

## 2022-10-12 DIAGNOSIS — M79.604 PAIN IN RIGHT LEG: ICD-10-CM

## 2022-10-12 DIAGNOSIS — Q21.1 ATRIAL SEPTAL DEFECT: Chronic | ICD-10-CM

## 2022-10-12 DIAGNOSIS — Q89.9 CONGENITAL MALFORMATION, UNSPECIFIED: Chronic | ICD-10-CM

## 2022-10-12 LAB
ALBUMIN SERPL ELPH-MCNC: 4.9 G/DL
ANA SER IF-ACNC: NEGATIVE
ANION GAP SERPL CALC-SCNC: 13 MMOL/L
BUN SERPL-MCNC: 8 MG/DL
CALCIUM SERPL-MCNC: 10 MG/DL
CHLORIDE SERPL-SCNC: 101 MMOL/L
CO2 SERPL-SCNC: 23 MMOL/L
CREAT SERPL-MCNC: 0.72 MG/DL
DSDNA AB SER-ACNC: <12 IU/ML
EGFR: 117 ML/MIN/1.73M2
GLUCOSE SERPL-MCNC: 97 MG/DL
PHOSPHATE SERPL-MCNC: 4.2 MG/DL
POTASSIUM SERPL-SCNC: 3.9 MMOL/L
SODIUM SERPL-SCNC: 138 MMOL/L
T4 FREE SERPL-MCNC: 1.2 NG/DL
THYROPEROXIDASE AB SERPL IA-ACNC: <10 IU/ML
TSH SERPL-ACNC: 1.54 UIU/ML

## 2022-10-12 PROCEDURE — 93970 EXTREMITY STUDY: CPT | Mod: 26

## 2022-10-12 PROCEDURE — 93970 EXTREMITY STUDY: CPT

## 2022-11-08 ENCOUNTER — NON-APPOINTMENT (OUTPATIENT)
Age: 27
End: 2022-11-08

## 2022-11-16 ENCOUNTER — APPOINTMENT (OUTPATIENT)
Dept: PEDIATRIC SURGERY | Facility: CLINIC | Age: 27
End: 2022-11-16

## 2022-11-30 ENCOUNTER — NON-APPOINTMENT (OUTPATIENT)
Age: 27
End: 2022-11-30

## 2022-11-30 ENCOUNTER — APPOINTMENT (OUTPATIENT)
Dept: PEDIATRIC SURGERY | Facility: CLINIC | Age: 27
End: 2022-11-30

## 2022-11-30 VITALS — HEIGHT: 63.98 IN | TEMPERATURE: 206.6 F | WEIGHT: 145.28 LBS | BODY MASS INDEX: 24.8 KG/M2

## 2022-11-30 DIAGNOSIS — R15.9 FULL INCONTINENCE OF FECES: ICD-10-CM

## 2022-11-30 PROCEDURE — 99204 OFFICE O/P NEW MOD 45 MIN: CPT

## 2022-11-30 NOTE — CONSULT LETTER
[FreeTextEntry2] : Maisha Hutchinson MD [FreeTextEntry3] : Damion Harris MD FAAP FACS\par Director, [facility]\par Division of Pediatric General, Thoracic and Endoscopic Surgery\par [facility]

## 2022-11-30 NOTE — ASSESSMENT
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 Yoon. VACTERL w/u consistent with VSD/ASD with coarctation of aorta, absent uterus, cleft palate. Born in [place] where she had a diverting colostomy.  A Daniel was placed in 2013, followed by a chait due to painful catheterizations. She is complaining of leakage from the Daniel site.\par \par On exam, her abdomen is benign. I deferred a pelvic/rectal exam.  An attempt was made to visualize and cannulate the daniel but was unable to. I recommended that she undergo an EUA, possible local Daniel revision, and contrast enema in the operating room  before we finalize a treatment plan. I reviewed the possibility that a chait may be inserted. I also discussed the option of a mini-Ace G-tube. She should follow up with me afterwards to discuss the results. I also recommended that she obtain a kidney ultrasound for further evaluation. I will also consult with Dr. Lindquist (urogyn) before finalizing a treatment plan. She has indicated her understanding. She has my information and knows to contact me sooner with any questions or concerns.

## 2022-11-30 NOTE — HISTORY OF PRESENT ILLNESS
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 Yoon. VACTERL w/u consistent with VSD/ASD and coarctation of aorta, absent uterus, cleft palate. Born in [place] where she underwent a diverting colostomy.  Was adopted by an American family.  Dr Gold did her PSARUVP, colostomy closure and daniel (2013).  \par She is currently followed by Cardiology at [facility]. Never achieved total continence with antegrade enemas always had a lot of leaking of stool.  Dr Yoon referred her for colonic manometry and defecography in [place] with Dr. Harmon. The motility studies were unremarkable. She last presented to Dr. Yoon's colorectal clinic in 2018. She has not seen Dr. Yoon since he moved.  She stopped doing enemas and is currently taking Imodium 2 tabs BID.  It will keep her clean and have a BM every 3-4 days.  Is having leaking from her Daniel site.  She has not used it in over 2 years.  She is unable to cannulate it but it will leak at times.  She would like to discuss reopening the daniel because she would like to use it as needed. \par \par She is followed by Dr. Mane of Urogynecology at [facility] in [facility]. She was previously followed by Dr. Harmon at [facility]. She was given a set of dilators by Dr. Harmon but has not attempted to dilate. She urinates with control and has not had any UTI. She notes that she previously used to urinate when seeing the toilet. \par \par She is followed by Endocrinology since 2019 for a pituitary adenoma. She currently sees pelvic floor PT in [place]. She currently does not have a nutritionist or a gastroenterologist. \par \par She is currently sexually active. \par \par She is here today because her Daniel has been leaking and has had occasional accidents. She experiences some cramping and abdominal pain. In September 2022, her Daniel started pouring mucus and she complained of intense pain. She treated the area with Tegaderm. Around 2-3 weeks ago she had a similar incident with mild discomfort. She has not had a catheter placed in since 2020. She has not recently attempted to put the catheter in and notes she previously had pain when trying to pass it. She is no longer doing any enemas.

## 2022-11-30 NOTE — ADDENDUM
[FreeTextEntry1] : Documented by Lilly Cano acting as a scribe for Dr. Harris on 11/30/2022.\par \par All medical record entries made by the Scribe were at my, Dr. Harris, direction and personally dictated by me on 11/30/2022. I have reviewed the chart and agree that the record accurately reflects my personal performances of the history, physical exam, assessment and plan. I have also personally directed, reviewed, and agree with the discharge instructions.

## 2023-01-31 DIAGNOSIS — Z90.710 ACQUIRED ABSENCE OF BOTH CERVIX AND UTERUS: ICD-10-CM

## 2023-02-10 ENCOUNTER — APPOINTMENT (OUTPATIENT)
Dept: OBGYN | Facility: CLINIC | Age: 28
End: 2023-02-10
Payer: COMMERCIAL

## 2023-02-10 ENCOUNTER — APPOINTMENT (OUTPATIENT)
Dept: INTERNAL MEDICINE | Facility: CLINIC | Age: 28
End: 2023-02-10
Payer: COMMERCIAL

## 2023-02-10 VITALS
DIASTOLIC BLOOD PRESSURE: 71 MMHG | WEIGHT: 140 LBS | BODY MASS INDEX: 23.9 KG/M2 | HEIGHT: 64 IN | SYSTOLIC BLOOD PRESSURE: 111 MMHG | HEART RATE: 73 BPM

## 2023-02-10 VITALS
HEART RATE: 65 BPM | SYSTOLIC BLOOD PRESSURE: 116 MMHG | HEIGHT: 64 IN | OXYGEN SATURATION: 97 % | DIASTOLIC BLOOD PRESSURE: 67 MMHG | WEIGHT: 140 LBS | TEMPERATURE: 206.96 F | BODY MASS INDEX: 23.9 KG/M2

## 2023-02-10 DIAGNOSIS — Z63.5 DISRUPTION OF FAMILY BY SEPARATION AND DIVORCE: ICD-10-CM

## 2023-02-10 DIAGNOSIS — M25.552 PAIN IN LEFT HIP: ICD-10-CM

## 2023-02-10 PROCEDURE — 99213 OFFICE O/P EST LOW 20 MIN: CPT

## 2023-02-10 PROCEDURE — 99204 OFFICE O/P NEW MOD 45 MIN: CPT

## 2023-02-10 RX ORDER — DOXYCYCLINE HYCLATE 100 MG/1
100 TABLET ORAL
Refills: 0 | Status: COMPLETED | COMMUNITY
Start: 2022-07-01 | End: 2023-02-10

## 2023-02-10 RX ORDER — CLINDAMYCIN PHOSPHATE 1 G/10ML
1 GEL TOPICAL
Refills: 0 | Status: COMPLETED | COMMUNITY
Start: 2022-07-01 | End: 2023-02-10

## 2023-02-10 SDOH — SOCIAL STABILITY - SOCIAL INSECURITY: DISRUPTION OF FAMILY BY SEPARATION AND DIVORCE: Z63.5

## 2023-02-10 NOTE — HISTORY OF PRESENT ILLNESS
[de-identified] : Sandi Haynes is a 29 y/o F with PMhx of ASD/VSD/coarctation of aorta s/p repair, congenital imperforate anus s/p repair/ appendicostomy c/b colonic dysmotility, pituitary adenoma, and anxiety/depression who presents for follow-up\par \par Had severe leg pain after flying in Oct, exams were negative. 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

## 2023-02-10 NOTE — PHYSICAL EXAM
[No Acute Distress] : no acute distress [Well Nourished] : well nourished [Well Developed] : well developed [Normal Sclera/Conjunctiva] : normal sclera/conjunctiva [No Respiratory Distress] : no respiratory distress  [No Accessory Muscle Use] : no accessory muscle use [Clear to Auscultation] : lungs were clear to auscultation bilaterally [Normal Rate] : normal rate  [Regular Rhythm] : with a regular rhythm [No Edema] : there was no peripheral edema [de-identified] : Answering questions appropriately. Gets on exam table and ambulates without assistance

## 2023-02-14 PROBLEM — Z63.5 SEPARATED FROM SPOUSE: Status: ACTIVE | Noted: 2023-02-14

## 2023-02-14 NOTE — PHYSICAL EXAM
[Chaperone Present] : A chaperone was present in the examining room during all aspects of the physical examination [Awake] : awake [Alert] : alert [Cooperative] : was ~L cooperative [Soft] : soft [Acute Distress] : no acute distress [LAD] : no lymphadenopathy [Thyroid Nodule] : no thyroid nodule [Goiter] : no goiter [Acanthosis Nigricans] : no acanthosis nigricans [Mass] : no breast mass [Nipple Discharge] : no nipple discharge [Axillary LAD] : no axillary lymphadenopathy [Tender] : non tender [Distended] : non distended [de-identified] : normal  [FreeTextEntry4] : accommodates single digit; unable to tolerate insertion of narrow pediatric speculum. Slight pain with initial penetration, reports more pain with deep penetration. No tightness of pelvic floor muscles noted.

## 2023-02-14 NOTE — ASSESSMENT
[FreeTextEntry1] : Detailed discussion regarding the following: \par - Possible causes of dyspareunia. In ROSS's case, this may be secondary to having a rectal jack-vagina, as the mucosa is pliable but not to the same degree as native tissue. She does not seem to have pelvic floor muscle tightness on exam, however does report some pelvic floor dysfunction such as stress incontinence. I discussed the importance of breaking the cycle of pain and anticipatory anxiety, whether through regular vaginal dilations vs other relaxation methods. \par - Pros/cons of introitoplasty/resection of neovaginal mucosa. There is only a very small amount of prolapsed mucosa, so resection of this may not change her symptoms. In addition, she would have to abstain from sexual activity / anything in vagina for several weeks, and this may set her back in terms of her sexual function. ROSS is amenable to whichever course we recommend. \par - Safe sexual practices including consistent condom use

## 2023-02-14 NOTE — COUNSELING
[STD (testing, results, tx)] : STD (testing, results, tx) [Safe Sexual Practices] : safe sexual practices [Dilation Techniques] : dilation techniques

## 2023-02-14 NOTE — LETTER GREETING
[Dear  ___] : Dear  [unfilled], [I had the pleasure of evaluating your patient, [unfilled]for a consultation for ___] : I had the pleasure of evaluating your patient, [unfilled] for a consultation for [unfilled].

## 2023-02-14 NOTE — HISTORY OF PRESENT ILLNESS
[Sexually Active] : ~he/she~ is sexually active [Oral] : oral [Anal] : anal [Vaginal] : vaginal [Male ___] : [unfilled] male

## 2023-02-14 NOTE — LETTER CLOSING
[Consult] : Thank you very much for allowing me to participate in the care of this patient. If you have any questions, please do not hesitate to contact me

## 2023-03-30 ENCOUNTER — APPOINTMENT (OUTPATIENT)
Dept: ENDOCRINOLOGY | Facility: CLINIC | Age: 28
End: 2023-03-30
Payer: COMMERCIAL

## 2023-03-30 VITALS
HEART RATE: 65 BPM | DIASTOLIC BLOOD PRESSURE: 70 MMHG | SYSTOLIC BLOOD PRESSURE: 100 MMHG | BODY MASS INDEX: 22.88 KG/M2 | OXYGEN SATURATION: 98 % | HEIGHT: 64 IN | WEIGHT: 134 LBS

## 2023-03-30 PROCEDURE — 99214 OFFICE O/P EST MOD 30 MIN: CPT

## 2023-03-31 NOTE — ASSESSMENT
[FreeTextEntry1] : Patient is a 28 F with history of Mullerian agenesis, microprolactinemia, subclinical hypothyroidism here for follow up. \par \par # Microprolactinoma \par - Previously had blood work which showed 09/13/19: ACTH 70.3, AM Cortisol 19.5, Prolactin 40.6, Monomeric Prolactin 31, 34% macroprolactin, Androstenedione 343\par - 11/25/19: MRI Brain showed pituitary gland approx  0.8 x 0.7 cm in size \par 07/29/21: Pituitary Gland adenoma minimally changed in size. 8 mm in size \par - Started on Cabergoline 0.25 mg twice weekly since 02/2022\par - Most recent imaging 07/08/2022: pituitary adenoma 3 mm (decrease in size from prior)\par - Check prolactin level today, repeat MRI in 07/2023\par - Discussed with the patient we will likely keep her on cabergoline for 1-2 years and if brain MRI shows stable microadenoma, we will stop cabergoline\par \par # Subclinical hypothyroidism \par - TSH elevated at 5.6 with normal FT4\par - No obvious symptoms of hypothyroidism \par - Repeat TFT today\par \par # HDL \par - LDL elevated along with non HDL \par - Not on medications \par \par # 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\[place] [address]\par Tel [phone]\par Fax [phone]

## 2023-03-31 NOTE — HISTORY OF PRESENT ILLNESS
[FreeTextEntry1] : Patient is a 28 F with history of Mullerian agenesis, microprolactinemia, subclinical hypothyroidism here for follow up. Last seen me in 09/2022. \par \par PMH: Anxiety, Congenital anomaly of the heart, imperforate anus (s/p repair with creation of vaginal ostomy from colonic wall), cleft palate, anorexia nervosa, uterine agenesis. H/o Bipolar disease and suicide attempt and had rehab done\par PSH: Coarctation of the aorta - VSD-ASD-PDA. Cloaca requiring colostomy and then reversal of procedure. In the process of looking for new GI doctor. \par Soc Hx: No tobacco. Occ alcohol. once/week. Patient is adopted. Currently working as a  at a dentist office. 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 No history of getting any periods in her life. No history of getting hormone replacement therapy. She admits to having normal puberty, height attainment and breast development. \par 08/26/19: Prolactin 37.3, ACTH 69.4, AM Cortisol 16, HGH 0.07, Androstenedione 252, 17-OHP normal, TFTS normal\par Pelvic U/S 09/11/19: Congenitally absent uterus but has normal Left and Right ovary.\par She saw  and had normal chromosomal analysis testing, was normal. \par \par # Microprolactinoma \par - Previously had blood work which showed 09/13/19: ACTH 70.3, AM Cortisol 19.5, Prolactin 40.6, Monomeric Prolactin 31, 34% macroprolactin, Androstenedione 343\par - 11/25/19: MRI Brain showed pituitary gland approx 0.8 cm in height which is normal. Normal posterior pituitary bright spot. Pituitary stalk is centrally located and appears normal. Optic chiasm was normal. Vague area of decreased enhancement was seen involving the right pituitary gland. It measures 0.8 x 0.7 cm in size and could be a vague pituitary microadenoma.Spoke with radiologist who mentioned normal pituitary gland.\par 07/29/21: Pituitary Gland adenoma minimally changed in size. 8 mm in size \par - Started on Cabergoline 0.25 mg twice weekly since 02/2022\par 07/08/2022: pituitary adenoma 3 mm (decrease in size from prior)\par \par # Subclinical hypothyroidism \par TSH elevated at 5.6 with normal FT4\par No obvious symptoms of hypothyroidism \par \par # HDL \par - LDL elevated along with non HDL \par - Not on medications \par \par 9/30/2022 visit: Occ headaches and occasional blurry vision. She has no h/o bone fractures. No headaches. No abdominal striae, no weight gain, no HTN or T2DM. No proximal myopathy. No breast pain or galactorrhea. Does not get periods due to congenitally absent uterus. On cabergoline 0.25 mg twice per week (on Cabergoline since 02/2022). 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 Occasional fatigue. \par \par 3/30/2023 visit events: getting a divorce. Getting GI surgery soon. Denies galactorrhea. Denies significant fatigue. Has been feeling whooshing session in her ear and eyes cannot focus. \par

## 2023-04-05 DIAGNOSIS — M25.50 PAIN IN UNSPECIFIED JOINT: ICD-10-CM

## 2023-04-10 ENCOUNTER — APPOINTMENT (OUTPATIENT)
Dept: INTERNAL MEDICINE | Facility: CLINIC | Age: 28
End: 2023-04-10

## 2023-04-12 ENCOUNTER — NON-APPOINTMENT (OUTPATIENT)
Age: 28
End: 2023-04-12

## 2023-04-19 ENCOUNTER — APPOINTMENT (OUTPATIENT)
Dept: PEDIATRIC SURGERY | Facility: CLINIC | Age: 28
End: 2023-04-19
Payer: COMMERCIAL

## 2023-04-19 VITALS — TEMPERATURE: 97 F | WEIGHT: 135.8 LBS | BODY MASS INDEX: 23.31 KG/M2

## 2023-04-19 PROCEDURE — XXXXX: CPT

## 2023-04-19 NOTE — CONSULT LETTER
[Dear  ___] : Dear  [unfilled], [Consult Letter:] : I had the pleasure of evaluating your patient, [unfilled]. [Please see my note below.] : Please see my note below. [Consult Closing:] : Thank you very much for allowing me to participate in the care of this patient.  If you have any questions, please do not hesitate to contact me. [Sincerely,] : Sincerely, [FreeTextEntry2] : Maisha Hutchinson MD [FreeTextEntry3] : Damion Harris MD FAAP FACS\par Director, [facility]\par Division of Pediatric General, Thoracic and Endoscopic Surgery\par [facility]

## 2023-04-19 NOTE — HISTORY OF PRESENT ILLNESS
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 Yoon. VACTERL w/u consistent with VSD/ASD and coarctation of aorta, absent uterus, cleft palate. Born in [place] where she underwent a diverting colostomy.  Was adopted by an American family.  Dr Gold did her PSARUVP, colostomy closure and daniel (2013).  \par \par She is currently followed by Cardiology at [facility]. Never achieved total continence with antegrade enemas always had a lot of leaking of stool.  Dr Yoon referred her for colonic manometry and defecography in [place] with Dr. Harmon. The motility studies were unremarkable. She last presented to Dr. Yoon's colorectal clinic in 2018. She has not seen Dr. Yoon since he moved.  She stopped doing enemas and is currently taking Imodium 2 tabs BID.  It will keep her clean and have a BM every 3-4 days.  Is having leaking from her Daniel site.  She has not used it in over 2 years.  She is unable to cannulate it but it will leak at times.  She would like to discuss reopening the daniel because she would like to use it as needed. \par \par She is followed by Dr. Mane of Urogynecology at [facility] in [facility]. She was previously followed by Dr. Harmon at [facility]. She was given a set of dilators by Dr. Harmon but has not attempted to dilate. She urinates with control and has not had any UTI. She notes that she previously used to urinate when seeing the toilet. \par \par She is followed by Endocrinology since 2019 for a pituitary adenoma. She currently sees pelvic floor PT in [place]. \par \par She presented to the colorectal clinic on 11/30/22 because her Daniel has been leaking and has had occasional accidents. She experiences some cramping and abdominal pain. In September 2022, her Daniel started pouring mucus and she complained of intense pain. She treated the area with Tegaderm. Around 2-3 weeks ago she had a similar incident with mild discomfort. She has not had a catheter placed in since 2020. She has not recently attempted to put the catheter in and notes she previously had pain when trying to pass it. She is no longer doing any enemas. We were unable to cannulate the Daniel in office. It was recommended that she undergo an EUA and contrast enema in the OR with an option to place a chait or mini-ACE. \par \par Since her previous visit she consulted with Dr. No Norris of GYN who recommended vaginal dilations vs resection of neovaginal mucosa. \par \par Sandi is presenting today for a follow up visit. Her Daniel site occasionally leaks mucous. She has been treating the site with Tegaderm. She first noted prolapse around 1 week ago. The prolapse has not reduced. She occasionally has the sensation to have a normal bowel movement. She denies any accidents for the past few months except after intercourse. She feels that enemas did not always help her fully empty out, as she would retain the water. She also notes extreme pain after vaginal sex. She is not followed by Urology. She has an appointment with Dr. Norris of Gynecology on Monday. She has been taking Lomotil. \par

## 2023-04-19 NOTE — ASSESSMENT
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 Yoon. VACTERL w/u consistent with VSD/ASD and coarctation of aorta, absent uterus, cleft palate. Born in [facility] where she underwent a diverting colostomy.  Was adopted by an American family.  Dr Gold did her PSARUVP, colostomy closure and daniel (2013).  \par \par On exam, there is some evidence of prolapse. An attempt was made to visualize and cannulate the daniel but was unable to.  I reviewed the risks and benefits of formally closing the Daniel site vs keeping the Daniel site.  I also discussed that her colon is most likely hypermotile. I first recommended that she obtain a contrast enema with the radiologist instead of intra-operatively to determine the anatomy of her colon. I also recommended that she undergo an EUA and possible local Daniel revision in the future. I reviewed the possibility that a chait may be inserted. I also discussed the option of a mini-Ace G-tube.  I recommended that she keep her appointment with Dr. Norris on Monday. I will consult with Dr. Norris before her appointment and discussed that a contrast study of the neovagina and MRI of the pelvis may be indicated. Sandi has indicated her understanding. She has my information and knows to contact me sooner with any questions or concerns. \par \par

## 2023-04-19 NOTE — ADDENDUM
[FreeTextEntry1] : Documented by Lilly Cano acting as a scribe for Dr. Harris on 04/19/2023.\par \par All medical record entries made by the Scribe were at my, Dr. Harris, direction and personally dictated by me on 04/19/2023. I have reviewed the chart and agree that the record accurately reflects my personal performances of the history, physical exam, assessment and plan. I have also personally directed, reviewed, and agree with the discharge instructions.

## 2023-04-23 PROBLEM — N94.2 VAGINISMUS: Noted: 2023-02-10

## 2023-04-24 ENCOUNTER — APPOINTMENT (OUTPATIENT)
Dept: OBGYN | Facility: CLINIC | Age: 28
End: 2023-04-24
Payer: COMMERCIAL

## 2023-04-24 VITALS
HEIGHT: 64 IN | WEIGHT: 136 LBS | BODY MASS INDEX: 23.22 KG/M2 | HEART RATE: 65 BPM | SYSTOLIC BLOOD PRESSURE: 99 MMHG | DIASTOLIC BLOOD PRESSURE: 67 MMHG

## 2023-04-24 DIAGNOSIS — R19.7 DIARRHEA, UNSPECIFIED: ICD-10-CM

## 2023-04-24 DIAGNOSIS — N97.2 FEMALE INFERTILITY OF UTERINE ORIGIN: ICD-10-CM

## 2023-04-24 DIAGNOSIS — F80.9 DEVELOPMENTAL DISORDER OF SPEECH AND LANGUAGE, UNSPECIFIED: ICD-10-CM

## 2023-04-24 DIAGNOSIS — Z11.1 ENCOUNTER FOR SCREENING FOR RESPIRATORY TUBERCULOSIS: ICD-10-CM

## 2023-04-24 DIAGNOSIS — Q89.9 CONGENITAL MALFORMATION, UNSPECIFIED: ICD-10-CM

## 2023-04-24 DIAGNOSIS — Z11.52 ENCOUNTER FOR SCREENING FOR COVID-19: ICD-10-CM

## 2023-04-24 DIAGNOSIS — R03.1 NONSPECIFIC LOW BLOOD-PRESSURE READING: ICD-10-CM

## 2023-04-24 DIAGNOSIS — N94.2 VAGINISMUS: ICD-10-CM

## 2023-04-24 DIAGNOSIS — Z20.822 CONTACT WITH AND (SUSPECTED) EXPOSURE TO COVID-19: ICD-10-CM

## 2023-04-24 PROCEDURE — 99215 OFFICE O/P EST HI 40 MIN: CPT | Mod: 57

## 2023-04-24 NOTE — PHYSICAL EXAM
[Alert] : alert [Awake] : awake [Cooperative] : was ~L cooperative [Soft] : soft [Acute Distress] : no acute distress [LAD] : no lymphadenopathy [Thyroid Nodule] : no thyroid nodule [Goiter] : no goiter [Acanthosis Nigricans] : no acanthosis nigricans [Mass] : no breast mass [Nipple Discharge] : no nipple discharge [Axillary LAD] : no axillary lymphadenopathy [Tender] : non tender [Distended] : non distended [de-identified] : normal  [FreeTextEntry4] : accommodates single digit; unable to tolerate insertion of narrow pediatric speculum. Slight pain with initial penetration, reports more pain with deep penetration. No tightness of pelvic floor muscles noted.

## 2023-04-30 PROBLEM — N97.2: Noted: 2019-04-23

## 2023-04-30 PROBLEM — Z20.822 CLOSE EXPOSURE TO COVID-19 VIRUS: Status: RESOLVED | Noted: 2021-09-15 | Resolved: 2023-01-31

## 2023-04-30 PROBLEM — F80.9 SPEECH DELAY: Status: RESOLVED | Noted: 2019-09-23 | Resolved: 2023-04-30

## 2023-04-30 PROBLEM — R19.7 LIQUID STOOL: Status: RESOLVED | Noted: 2017-05-30 | Resolved: 2023-04-30

## 2023-04-30 PROBLEM — Z11.52 ENCOUNTER FOR SCREENING FOR COVID-19: Status: RESOLVED | Noted: 2021-12-28 | Resolved: 2023-01-31

## 2023-04-30 PROBLEM — Z11.1 SCREENING-PULMONARY TB: Status: RESOLVED | Noted: 2018-12-03 | Resolved: 2023-04-30

## 2023-04-30 PROBLEM — R03.1 LOW BLOOD PRESSURE, NOT HYPOTENSION: Status: RESOLVED | Noted: 2019-08-09 | Resolved: 2023-04-30

## 2023-04-30 NOTE — COUNSELING
[STD (testing, results, tx)] : STD (testing, results, tx) [Safe Sexual Practices] : safe sexual practices [Dilation Techniques] : dilation techniques [Pain Management] : pain management [Pre/Post Op Instructions] : pre/post op instructions

## 2023-04-30 NOTE — ASSESSMENT
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  We reviewed that introitoplasty/resection of prolapsed tissue may be performed as a temporizing measure, but is unlikely to be a definitive treatment (she will likely have recurrent prolapse). \par \par Ultimately Sandi is interested in more extensive surgical management for these concerns. She recently saw Dr. Harris and is planning for a contrast enema for presurgical planning.  We discussed the possible findings as well as the possible procedures we could do to address the pain with vaginal penetration; the neovagina may need to be mobilized from above (abdominally), releasing adhesions, while avoiding bringing down the apex & worsening the prolapse. \par \par I reinforced, as well, the importance of future vaginal dilations and of pelvic floor PT to address the multiple factors causing pain.

## 2023-04-30 NOTE — LETTER GREETING
[Dear  ___] : Dear  [unfilled], [FreeTextEntry1] : I had the pleasure of evaluating your patient, ROSS NIX, on Apr 24, 2023. \par Please see my note, attached. \par If you have any questions, please do not hesitate to contact me.

## 2023-05-09 ENCOUNTER — NON-APPOINTMENT (OUTPATIENT)
Age: 28
End: 2023-05-09

## 2023-06-16 ENCOUNTER — RESULT REVIEW (OUTPATIENT)
Age: 28
End: 2023-06-16

## 2023-06-16 ENCOUNTER — OUTPATIENT (OUTPATIENT)
Dept: OUTPATIENT SERVICES | Facility: HOSPITAL | Age: 28
LOS: 1 days | End: 2023-06-16

## 2023-06-16 ENCOUNTER — APPOINTMENT (OUTPATIENT)
Dept: RADIOLOGY | Facility: HOSPITAL | Age: 28
End: 2023-06-16
Payer: COMMERCIAL

## 2023-06-16 DIAGNOSIS — Q43.7 PERSISTENT CLOACA: ICD-10-CM

## 2023-06-16 DIAGNOSIS — Q89.9 CONGENITAL MALFORMATION, UNSPECIFIED: Chronic | ICD-10-CM

## 2023-06-16 DIAGNOSIS — Q21.1 ATRIAL SEPTAL DEFECT: Chronic | ICD-10-CM

## 2023-06-16 DIAGNOSIS — Z98.890 OTHER SPECIFIED POSTPROCEDURAL STATES: Chronic | ICD-10-CM

## 2023-06-16 PROCEDURE — 74270 X-RAY XM COLON 1CNTRST STD: CPT | Mod: 26

## 2023-07-03 ENCOUNTER — NON-APPOINTMENT (OUTPATIENT)
Age: 28
End: 2023-07-03

## 2023-07-20 ENCOUNTER — NON-APPOINTMENT (OUTPATIENT)
Age: 28
End: 2023-07-20

## 2023-07-26 ENCOUNTER — APPOINTMENT (OUTPATIENT)
Dept: INTERNAL MEDICINE | Facility: CLINIC | Age: 28
End: 2023-07-26
Payer: COMMERCIAL

## 2023-07-26 VITALS
OXYGEN SATURATION: 98 % | HEIGHT: 64 IN | WEIGHT: 129 LBS | HEART RATE: 75 BPM | SYSTOLIC BLOOD PRESSURE: 110 MMHG | BODY MASS INDEX: 22.02 KG/M2 | DIASTOLIC BLOOD PRESSURE: 70 MMHG

## 2023-07-26 DIAGNOSIS — J06.9 ACUTE UPPER RESPIRATORY INFECTION, UNSPECIFIED: ICD-10-CM

## 2023-07-26 PROCEDURE — 99214 OFFICE O/P EST MOD 30 MIN: CPT

## 2023-07-27 PROBLEM — J06.9 VIRAL UPPER RESPIRATORY ILLNESS: Status: ACTIVE | Noted: 2023-07-26 | Resolved: 2023-08-25

## 2023-07-27 LAB
RAPID RVP RESULT: DETECTED
SARS-COV-2 RNA PNL RESP NAA+PROBE: DETECTED

## 2023-07-27 NOTE — PHYSICAL EXAM
[Normal TMs] : both tympanic membranes were normal [Thyroid Normal, No Nodules] : the thyroid was normal and there were no nodules present [No Lymphadenopathy] : no lymphadenopathy [Normal] : normal rate, regular rhythm, normal S1 and S2 and no murmur heard [de-identified] : slight bogginess of rihgt nostril with scant clear secretion, left nosreil clear, TM intact, thraot noninjected with surgical scars from cleft palate surgery as child, no cobblestoning, tonsi;ls not enlarged, NO PATRICIA

## 2023-07-27 NOTE — REVIEW OF SYSTEMS
[Fever] : fever [Recent Change In Weight] : ~T no recent weight change [Earache] : no earache [Hearing Loss] : no hearing loss [Nasal Discharge] : no nasal discharge [Sore Throat] : sore throat [Postnasal Drip] : no postnasal drip [Abdominal Pain] : no abdominal pain [Nausea] : no nausea [Constipation] : no constipation [Vomiting] : no vomiting [Negative] : Gastrointestinal

## 2023-07-27 NOTE — HISTORY OF PRESENT ILLNESS
[FreeTextEntry8] : Did not feel well yesterday, Temp 102 (average 97.4)  T 99, at 3pm, and by yesterday evening, Temp 102.\par Sore throat yesterday, no coughing, eyes hurt\par Myalgias, bilateral arm pain,neck stiffness, felt her muscles in hand were being plucked, symptoms improved today.\par Able to eat and stay hydrated.\par COVID test negative yesterday\par Had COVID vaccines in 2021/2022\par Was with friends Saturday\par Lives with family/parents \par Works at [employer] at [employer] , last there on Friday 4 days ago\par Works at dentist office , \par No sick contacts that she is aware of.\par LMP none as she was born without  uterus\par \par

## 2023-08-18 ENCOUNTER — APPOINTMENT (OUTPATIENT)
Dept: INTERNAL MEDICINE | Facility: CLINIC | Age: 28
End: 2023-08-18
Payer: COMMERCIAL

## 2023-08-18 ENCOUNTER — OUTPATIENT (OUTPATIENT)
Dept: OUTPATIENT SERVICES | Facility: HOSPITAL | Age: 28
LOS: 1 days | End: 2023-08-18
Payer: COMMERCIAL

## 2023-08-18 VITALS
HEART RATE: 64 BPM | HEIGHT: 64 IN | OXYGEN SATURATION: 98 % | WEIGHT: 131 LBS | SYSTOLIC BLOOD PRESSURE: 116 MMHG | BODY MASS INDEX: 22.36 KG/M2 | DIASTOLIC BLOOD PRESSURE: 68 MMHG

## 2023-08-18 DIAGNOSIS — I10 ESSENTIAL (PRIMARY) HYPERTENSION: ICD-10-CM

## 2023-08-18 DIAGNOSIS — Q21.1 ATRIAL SEPTAL DEFECT: Chronic | ICD-10-CM

## 2023-08-18 DIAGNOSIS — K59.9 FUNCTIONAL INTESTINAL DISORDER, UNSPECIFIED: ICD-10-CM

## 2023-08-18 DIAGNOSIS — Z98.890 OTHER SPECIFIED POSTPROCEDURAL STATES: Chronic | ICD-10-CM

## 2023-08-18 DIAGNOSIS — Q89.9 CONGENITAL MALFORMATION, UNSPECIFIED: Chronic | ICD-10-CM

## 2023-08-18 PROCEDURE — G0463: CPT

## 2023-08-18 PROCEDURE — 99395 PREV VISIT EST AGE 18-39: CPT

## 2023-08-18 NOTE — PHYSICAL EXAM
[No Acute Distress] : no acute distress [Well Nourished] : well nourished [Well Developed] : well developed [Normal Sclera/Conjunctiva] : normal sclera/conjunctiva [PERRL] : pupils equal round and reactive to light [EOMI] : extraocular movements intact [Normal Outer Ear/Nose] : the outer ears and nose were normal in appearance [No Lymphadenopathy] : no lymphadenopathy [Supple] : supple [Thyroid Normal, No Nodules] : the thyroid was normal and there were no nodules present [No Respiratory Distress] : no respiratory distress  [No Accessory Muscle Use] : no accessory muscle use [Clear to Auscultation] : lungs were clear to auscultation bilaterally [Normal Rate] : normal rate  [Regular Rhythm] : with a regular rhythm [Normal S1, S2] : normal S1 and S2 [Pedal Pulses Present] : the pedal pulses are present [No Edema] : there was no peripheral edema [Soft] : abdomen soft [Non-distended] : non-distended [No Masses] : no abdominal mass palpated [Kyphosis] : no kyphosis [No Joint Swelling] : no joint swelling [No Rash] : no rash [Speech Grossly Normal] : speech grossly normal [Memory Grossly Normal] : memory grossly normal [de-identified] : Scarred palate noted [de-identified] : Catheter/Braswell site c/d/i. Mild diffuse tenderness [de-identified] : No facial asymmetry. Strength equal in the upper and lower extremities. Finger-to-nose normal, heel-to-shin normal

## 2023-08-18 NOTE — HISTORY OF PRESENT ILLNESS
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  Thinking of establishing care with adult congenital colorectal care in [facility], but planning for combined surgery for Braswell takedown and vaginal adjustment with peds colorectal and peds gyn in the fall or winter  Allergies: Bactrim--hives

## 2023-08-18 NOTE — ASSESSMENT
[FreeTextEntry1] : HCM: - STI testing--GC vaginal self swab - lipids, a1c, CMP, CBC - COVID vaccinated   RTC 6 months for follow-up

## 2023-08-21 LAB
ALBUMIN SERPL ELPH-MCNC: 4.8 G/DL
ALP BLD-CCNC: 80 U/L
ALT SERPL-CCNC: 11 U/L
ANION GAP SERPL CALC-SCNC: 12 MMOL/L
AST SERPL-CCNC: 16 U/L
BILIRUB SERPL-MCNC: 0.3 MG/DL
BUN SERPL-MCNC: 13 MG/DL
C TRACH RRNA SPEC QL NAA+PROBE: NOT DETECTED
C TRACH RRNA SPEC QL NAA+PROBE: NOT DETECTED
CALCIUM SERPL-MCNC: 10.1 MG/DL
CHLORIDE SERPL-SCNC: 102 MMOL/L
CHOLEST SERPL-MCNC: 208 MG/DL
CO2 SERPL-SCNC: 26 MMOL/L
CREAT SERPL-MCNC: 0.84 MG/DL
EGFR: 96 ML/MIN/1.73M2
ESTIMATED AVERAGE GLUCOSE: 100 MG/DL
GLUCOSE SERPL-MCNC: 112 MG/DL
HBA1C MFR BLD HPLC: 5.1 %
HCV AB SER QL: NONREACTIVE
HCV S/CO RATIO: 0.12 S/CO
HDLC SERPL-MCNC: 66 MG/DL
HIV1+2 AB SPEC QL IA.RAPID: NONREACTIVE
LDLC SERPL CALC-MCNC: 123 MG/DL
N GONORRHOEA RRNA SPEC QL NAA+PROBE: NOT DETECTED
N GONORRHOEA RRNA SPEC QL NAA+PROBE: NOT DETECTED
NONHDLC SERPL-MCNC: 142 MG/DL
POTASSIUM SERPL-SCNC: 4 MMOL/L
PROT SERPL-MCNC: 7.5 G/DL
SODIUM SERPL-SCNC: 140 MMOL/L
SOURCE AMPLIFICATION: NORMAL
SOURCE AMPLIFICATION: NORMAL
T PALLIDUM AB SER QL IA: NEGATIVE
TRIGL SERPL-MCNC: 109 MG/DL
VIT B12 SERPL-MCNC: 731 PG/ML

## 2023-08-25 DIAGNOSIS — K59.9 FUNCTIONAL INTESTINAL DISORDER, UNSPECIFIED: ICD-10-CM

## 2023-08-25 DIAGNOSIS — Z00.00 ENCOUNTER FOR GENERAL ADULT MEDICAL EXAMINATION WITHOUT ABNORMAL FINDINGS: ICD-10-CM

## 2023-09-01 ENCOUNTER — APPOINTMENT (OUTPATIENT)
Dept: PEDIATRIC SURGERY | Facility: CLINIC | Age: 28
End: 2023-09-01
Payer: COMMERCIAL

## 2023-09-01 VITALS — WEIGHT: 131.18 LBS | HEIGHT: 64.57 IN | TEMPERATURE: 98.8 F | BODY MASS INDEX: 22.12 KG/M2

## 2023-09-01 DIAGNOSIS — Q42.3 CONGENITAL ABSENCE, ATRESIA AND STENOSIS OF ANUS W/OUT FISTULA: ICD-10-CM

## 2023-09-01 PROCEDURE — 99214 OFFICE O/P EST MOD 30 MIN: CPT

## 2023-09-01 NOTE — CONSULT LETTER
[Dear  ___] : Dear  [unfilled], [Consult Letter:] : I had the pleasure of evaluating your patient, [unfilled]. [Please see my note below.] : Please see my note below. [Consult Closing:] : Thank you very much for allowing me to participate in the care of this patient.  If you have any questions, please do not hesitate to contact me. [Sincerely,] : Sincerely, [FreeTextEntry2] : Maisha Hutchinson MD [FreeTextEntry3] : Damion Harris MD FAAP FACS Director, [facility] Division of Pediatric General, Thoracic and Endoscopic Surgery [facility] [place]

## 2023-09-01 NOTE — HISTORY OF PRESENT ILLNESS
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 Yoon. VACTERL w/u consistent with VSD/ASD and coarctation of aorta, absent uterus, cleft palate. Born in [place] where she underwent a diverting colostomy.  Was adopted by an American family.  Dr Gold did her PSARUVP, colostomy closure and daniel (2013).    She is currently followed by Cardiology at [facility]. Never achieved total continence with antegrade enemas always had a lot of leaking of stool.  Dr Yoon referred her for colonic manometry and defecography in [place] with Dr. Harmon. The motility studies were unremarkable. She last presented to Dr. Yoon's colorectal clinic in 2018. She has not seen Dr. Yoon since he moved.  She stopped doing enemas and is currently taking Imodium 2 tabs BID.  It will keep her clean and have a BM every 3-4 days.  Is having leaking from her Daniel site. She has not used it in over 2 years. She is unable to cannulate it but it will leak at times. She would like to discuss reopening the daniel because she would like to use it as needed.   She is followed by Dr. Mane of Urogynecology at [facility] in [facility]. She was previously followed by Dr. Harmon at [facility]. She was given a set of dilators by Dr. Harmon but has not attempted to dilate. She urinates with control and has not had any UTI. She notes that she previously used to urinate when seeing the toilet.   She is followed by Endocrinology since 2019 for a pituitary adenoma. She currently sees pelvic floor PT in [place].   She presented to the colorectal clinic on 11/30/22 because her Daniel has been leaking and has had occasional accidents. She experiences some cramping and abdominal pain. In September 2022, her Daniel started pouring mucus and she complained of intense pain. She treated the area with Tegaderm. Around 2-3 weeks ago she had a similar incident with mild discomfort. She has not had a catheter placed in since 2020. She has not recently attempted to put the catheter in and notes she previously had pain when trying to pass it. She is no longer doing any enemas. We were unable to cannulate the Daniel in office. It was recommended that she undergo an EUA and contrast enema in the OR with an option to place a chait or mini-ACE.   Since her previous visit she consulted with Dr. No Norris of GYN who recommended vaginal dilations vs resection of neovaginal mucosa.   Her Daniel site occasionally leaks mucous. She has been treating the site with Tegaderm. She first noted prolapse a week before her last visit. The prolapse has not reduced. She occasionally has the sensation to have a normal bowel movement. She denies any accidents for the past few months except after intercourse. She feels that enemas did not always help her fully empty out, as she would retain the water. She also notes extreme pain after vaginal sex. She is not followed by Urology. She has an appointment with Dr. Norris of Gynecology on Monday. She has been taking Lomotil. Since then, she was able to cannulate her Daniel at home, but it continues to leak.   Sandi presents today for a follow up visit. She continues to have leaking from the Daniel and would like to proceed with surgery to close the Daniel.

## 2023-09-01 NOTE — ASSESSMENT
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 Yoon. JANETTERL w/u consistent with VSD/ASD and coarctation of aorta, absent uterus, cleft palate. Born in [facility] where she underwent a diverting colostomy. Was adopted by an American family. Dr Gold did her PSARUVP, colostomy closure and Braswell (2013). I discussed the laparoscopic Braswell reversal. I discussed the procedure in detail with the patient. I reviewed the indications, risks and possible complications including the possibility of bleeding or infection, and the need for further surgery. She has indicated her understanding. She will schedule the procedure. I explained that I will speak with  prior to the surgical procedure. I prescribed Lomotil. She has my information and knows to contact me sooner with any questions or concerns.

## 2023-09-01 NOTE — ADDENDUM
[FreeTextEntry1] : Documented by Inés Morales acting as a scribe for  on 9/1/2023.   All medical record entries made by the Scribe were at my, , direction and personally dictated by me on 9/1/2023. I have reviewed the chart and agree that the record accurately reflects my personal performances of the history, physical exam, assessment and plan. I have also personally directed, reviewed, and agree with the discharge instructions.

## 2023-09-01 NOTE — REASON FOR VISIT
[Follow-up - Scheduled] : a follow-up, scheduled visit for [Bowel management] : bowel management [Patient] : patient

## 2023-09-08 ENCOUNTER — RX RENEWAL (OUTPATIENT)
Age: 28
End: 2023-09-08

## 2023-09-13 ENCOUNTER — APPOINTMENT (OUTPATIENT)
Dept: OBGYN | Facility: CLINIC | Age: 28
End: 2023-09-13
Payer: COMMERCIAL

## 2023-09-13 VITALS
HEIGHT: 64.57 IN | BODY MASS INDEX: 22.68 KG/M2 | HEART RATE: 54 BPM | SYSTOLIC BLOOD PRESSURE: 125 MMHG | WEIGHT: 134.48 LBS | DIASTOLIC BLOOD PRESSURE: 81 MMHG

## 2023-09-13 PROCEDURE — G2212 PROLONG OUTPT/OFFICE VIS: CPT

## 2023-09-13 PROCEDURE — 99215 OFFICE O/P EST HI 40 MIN: CPT

## 2023-09-13 PROCEDURE — 99417 PROLNG OP E/M EACH 15 MIN: CPT

## 2023-09-22 ENCOUNTER — TRANSCRIPTION ENCOUNTER (OUTPATIENT)
Age: 28
End: 2023-09-22

## 2023-09-25 ENCOUNTER — INPATIENT (INPATIENT)
Facility: HOSPITAL | Age: 28
LOS: 1 days | Discharge: HOME | DRG: 392 | End: 2023-09-27
Attending: STUDENT IN AN ORGANIZED HEALTH CARE EDUCATION/TRAINING PROGRAM | Admitting: STUDENT IN AN ORGANIZED HEALTH CARE EDUCATION/TRAINING PROGRAM
Payer: COMMERCIAL

## 2023-09-25 VITALS
SYSTOLIC BLOOD PRESSURE: 106 MMHG | OXYGEN SATURATION: 98 % | TEMPERATURE: 98 F | RESPIRATION RATE: 18 BRPM | HEIGHT: 64 IN | WEIGHT: 130.07 LBS | DIASTOLIC BLOOD PRESSURE: 64 MMHG | HEART RATE: 62 BPM

## 2023-09-25 DIAGNOSIS — Q89.9 CONGENITAL MALFORMATION, UNSPECIFIED: Chronic | ICD-10-CM

## 2023-09-25 DIAGNOSIS — Q21.1 ATRIAL SEPTAL DEFECT: Chronic | ICD-10-CM

## 2023-09-25 DIAGNOSIS — Z98.890 OTHER SPECIFIED POSTPROCEDURAL STATES: Chronic | ICD-10-CM

## 2023-09-25 LAB
ALBUMIN SERPL ELPH-MCNC: 4.8 G/DL — SIGNIFICANT CHANGE UP (ref 3.3–5)
ALP SERPL-CCNC: 67 U/L — SIGNIFICANT CHANGE UP (ref 40–120)
ALT FLD-CCNC: 8 U/L — LOW (ref 10–45)
ANION GAP SERPL CALC-SCNC: 16 MMOL/L — SIGNIFICANT CHANGE UP (ref 5–17)
AST SERPL-CCNC: 13 U/L — SIGNIFICANT CHANGE UP (ref 10–40)
BASE EXCESS BLDV CALC-SCNC: 2.4 MMOL/L — SIGNIFICANT CHANGE UP (ref -2–3)
BASOPHILS # BLD AUTO: 0.02 K/UL — SIGNIFICANT CHANGE UP (ref 0–0.2)
BASOPHILS NFR BLD AUTO: 0.2 % — SIGNIFICANT CHANGE UP (ref 0–2)
BILIRUB SERPL-MCNC: 0.7 MG/DL — SIGNIFICANT CHANGE UP (ref 0.2–1.2)
BUN SERPL-MCNC: 7 MG/DL — SIGNIFICANT CHANGE UP (ref 7–23)
CA-I SERPL-SCNC: 1.18 MMOL/L — SIGNIFICANT CHANGE UP (ref 1.15–1.33)
CALCIUM SERPL-MCNC: 9.9 MG/DL — SIGNIFICANT CHANGE UP (ref 8.4–10.5)
CHLORIDE BLDV-SCNC: 102 MMOL/L — SIGNIFICANT CHANGE UP (ref 96–108)
CHLORIDE SERPL-SCNC: 100 MMOL/L — SIGNIFICANT CHANGE UP (ref 96–108)
CO2 BLDV-SCNC: 28 MMOL/L — HIGH (ref 22–26)
CO2 SERPL-SCNC: 22 MMOL/L — SIGNIFICANT CHANGE UP (ref 22–31)
CREAT SERPL-MCNC: 0.71 MG/DL — SIGNIFICANT CHANGE UP (ref 0.5–1.3)
EGFR: 118 ML/MIN/1.73M2 — SIGNIFICANT CHANGE UP
EOSINOPHIL # BLD AUTO: 0 K/UL — SIGNIFICANT CHANGE UP (ref 0–0.5)
EOSINOPHIL NFR BLD AUTO: 0 % — SIGNIFICANT CHANGE UP (ref 0–6)
GAS PNL BLDV: 135 MMOL/L — LOW (ref 136–145)
GAS PNL BLDV: SIGNIFICANT CHANGE UP
GLUCOSE BLDV-MCNC: 134 MG/DL — HIGH (ref 70–99)
GLUCOSE SERPL-MCNC: 132 MG/DL — HIGH (ref 70–99)
HCG SERPL-ACNC: <2 MIU/ML — SIGNIFICANT CHANGE UP
HCO3 BLDV-SCNC: 26 MMOL/L — SIGNIFICANT CHANGE UP (ref 22–29)
HCT VFR BLD CALC: 40.8 % — SIGNIFICANT CHANGE UP (ref 34.5–45)
HCT VFR BLDA CALC: 43 % — SIGNIFICANT CHANGE UP (ref 34.5–46.5)
HGB BLD CALC-MCNC: 14.2 G/DL — SIGNIFICANT CHANGE UP (ref 11.7–16.1)
HGB BLD-MCNC: 13.8 G/DL — SIGNIFICANT CHANGE UP (ref 11.5–15.5)
IMM GRANULOCYTES NFR BLD AUTO: 0.3 % — SIGNIFICANT CHANGE UP (ref 0–0.9)
LACTATE BLDV-MCNC: 1.4 MMOL/L — SIGNIFICANT CHANGE UP (ref 0.5–2)
LIDOCAIN IGE QN: 19 U/L — SIGNIFICANT CHANGE UP (ref 7–60)
LYMPHOCYTES # BLD AUTO: 0.75 K/UL — LOW (ref 1–3.3)
LYMPHOCYTES # BLD AUTO: 6.1 % — LOW (ref 13–44)
MCHC RBC-ENTMCNC: 28.9 PG — SIGNIFICANT CHANGE UP (ref 27–34)
MCHC RBC-ENTMCNC: 33.8 GM/DL — SIGNIFICANT CHANGE UP (ref 32–36)
MCV RBC AUTO: 85.5 FL — SIGNIFICANT CHANGE UP (ref 80–100)
MONOCYTES # BLD AUTO: 0.39 K/UL — SIGNIFICANT CHANGE UP (ref 0–0.9)
MONOCYTES NFR BLD AUTO: 3.2 % — SIGNIFICANT CHANGE UP (ref 2–14)
NEUTROPHILS # BLD AUTO: 11.02 K/UL — HIGH (ref 1.8–7.4)
NEUTROPHILS NFR BLD AUTO: 90.2 % — HIGH (ref 43–77)
NRBC # BLD: 0 /100 WBCS — SIGNIFICANT CHANGE UP (ref 0–0)
PCO2 BLDV: 38 MMHG — LOW (ref 39–42)
PH BLDV: 7.45 — HIGH (ref 7.32–7.43)
PLATELET # BLD AUTO: 284 K/UL — SIGNIFICANT CHANGE UP (ref 150–400)
PO2 BLDV: 24 MMHG — LOW (ref 25–45)
POTASSIUM BLDV-SCNC: 3.4 MMOL/L — LOW (ref 3.5–5.1)
POTASSIUM SERPL-MCNC: 3.7 MMOL/L — SIGNIFICANT CHANGE UP (ref 3.5–5.3)
POTASSIUM SERPL-SCNC: 3.7 MMOL/L — SIGNIFICANT CHANGE UP (ref 3.5–5.3)
PROT SERPL-MCNC: 7.8 G/DL — SIGNIFICANT CHANGE UP (ref 6–8.3)
RBC # BLD: 4.77 M/UL — SIGNIFICANT CHANGE UP (ref 3.8–5.2)
RBC # FLD: 12.1 % — SIGNIFICANT CHANGE UP (ref 10.3–14.5)
SAO2 % BLDV: 39.9 % — LOW (ref 67–88)
SODIUM SERPL-SCNC: 138 MMOL/L — SIGNIFICANT CHANGE UP (ref 135–145)
TROPONIN T, HIGH SENSITIVITY RESULT: <6 NG/L — SIGNIFICANT CHANGE UP (ref 0–51)
WBC # BLD: 12.22 K/UL — HIGH (ref 3.8–10.5)
WBC # FLD AUTO: 12.22 K/UL — HIGH (ref 3.8–10.5)

## 2023-09-25 PROCEDURE — 99285 EMERGENCY DEPT VISIT HI MDM: CPT

## 2023-09-25 PROCEDURE — 76705 ECHO EXAM OF ABDOMEN: CPT | Mod: 26

## 2023-09-25 RX ORDER — FAMOTIDINE 10 MG/ML
20 INJECTION INTRAVENOUS ONCE
Refills: 0 | Status: COMPLETED | OUTPATIENT
Start: 2023-09-25 | End: 2023-09-25

## 2023-09-25 RX ORDER — SODIUM CHLORIDE 9 MG/ML
1000 INJECTION INTRAMUSCULAR; INTRAVENOUS; SUBCUTANEOUS ONCE
Refills: 0 | Status: COMPLETED | OUTPATIENT
Start: 2023-09-25 | End: 2023-09-25

## 2023-09-25 RX ORDER — ONDANSETRON 8 MG/1
4 TABLET, FILM COATED ORAL ONCE
Refills: 0 | Status: COMPLETED | OUTPATIENT
Start: 2023-09-25 | End: 2023-09-25

## 2023-09-25 RX ORDER — ACETAMINOPHEN 500 MG
1000 TABLET ORAL ONCE
Refills: 0 | Status: COMPLETED | OUTPATIENT
Start: 2023-09-25 | End: 2023-09-25

## 2023-09-25 RX ADMIN — ONDANSETRON 4 MILLIGRAM(S): 8 TABLET, FILM COATED ORAL at 21:21

## 2023-09-25 RX ADMIN — FAMOTIDINE 20 MILLIGRAM(S): 10 INJECTION INTRAVENOUS at 21:24

## 2023-09-25 RX ADMIN — SODIUM CHLORIDE 1000 MILLILITER(S): 9 INJECTION INTRAMUSCULAR; INTRAVENOUS; SUBCUTANEOUS at 21:24

## 2023-09-25 RX ADMIN — Medication 400 MILLIGRAM(S): at 21:27

## 2023-09-25 NOTE — ED PROVIDER NOTE - OBJECTIVE STATEMENT
28-year-old female with PMH multiple bowel surgeries, colostomy, open heart surgery when she was a baby for ASD, IBS, pituitary adenoma presents for 3 days of epigastric abdominal pain associated with nausea and vomiting.  Patient had 2 episodes of vomiting today nonbloody nonbilious and is unable to tolerate p.o.  No fevers, no diarrhea.  Endorses chest pain and shortness of breath.  Drinks 1-2 drinks of alcohol socially maybe once a week.  Does not have a uterus.    GI Dr. More  PCP Dr. Maisha Gregory 28-year-old female with PMH multiple bowel surgeries, colostomy, open heart surgery when she was a baby for ASD, IBS, pituitary adenoma presents for 3 days of epigastric abdominal pain associated with nausea and vomiting.  Patient had 2 episodes of vomiting today nonbloody nonbilious and is unable to tolerate p.o.  No fevers, no diarrhea.  Endorses chest pain and shortness of breath.  Drinks 1-2 drinks of alcohol socially maybe once a week.  Does not have a uterus.    GI Dr. More   PCP Dr. Maisha Gregory

## 2023-09-25 NOTE — ED ADULT NURSE NOTE - OBJECTIVE STATEMENT
28y Female AOX4 with PMH of multiple bowel surgeries, colostomy, open heart surgery for ASD, IBS, pituitary adenoma presents to the ED c/o abdominal pain. Pt states the pain started a few days ago along with nausea and vomiting.  Endorses x2 episodes of vomiting today. Pt visibly uncomfortable, in fetal position. Denies fever/chills, SOB, chest pain. Spontaneous/unlabored respirations, speaking in full sentences. Side rails up, bed in lowest position, safety maintained.

## 2023-09-25 NOTE — ED PROVIDER NOTE - PHYSICAL EXAMINATION
Gen: ill appearing, NAD  HEENT: no conjunctivitis  Cardiac: regular rate rhythm, normal S1S2  Chest: CTA BL, no wheeze or crackles  Abdomen: +RLQ tenderness, +epigastric tenderness  Extremity: no gross deformity, good perfusion  Skin: no rash  Neuro: grossly normal Gen: ill appearing, NAD  HEENT: no conjunctivitis  Cardiac: regular rate rhythm, normal S1S2  Chest: CTA BL, no wheeze or crackles  Abdomen: +RLQ tenderness, +epigastric tenderness, +guarding  Extremity: no gross deformity, good perfusion  Skin: no rash  Neuro: grossly normal

## 2023-09-25 NOTE — ED PROVIDER NOTE - CLINICAL SUMMARY MEDICAL DECISION MAKING FREE TEXT BOX
28-year-old female with complex medical history since birth including open heart surgery for ASD, multiple bowel surgeries and colostomy use with possible reversal, IBS, pituitary adenoma on cabergoline presents with 3 days of sharp upper abdominal pain, nausea and vomiting.  Unable to tolerate p.o.  Only has social alcohol use.  Last drink last Saturday.  Drinks 1-2 times a week.  Denies fevers, diarrhea but endorsing chills.  Endorses shortness of breath and chest pain likely secondary to vomiting episodes.  Exam shows diffuse abdominal tenderness to palpation mostly in the right lower quadrant and epigastric region with positive Penaloza sign.  Normal S1-S2, lungs clear to auscultation bilaterally.  Given history of bowel surgeries and current presentation concern for obstruction.  Will obtain labs, troponin, right upper quadrant ultrasound, CT abdomen and pelvis with IV and oral contrast.  Will give Zofran and analgesia.  Dispo pending results.

## 2023-09-25 NOTE — ED PROVIDER NOTE - NSICDXPASTMEDICALHX_GEN_ALL_CORE_FT
PAST MEDICAL HISTORY:  History of open heart surgery     IBS (irritable bowel syndrome)     Pituitary adenoma     S/P colostomy

## 2023-09-25 NOTE — ED PROVIDER NOTE - ATTENDING CONTRIBUTION TO CARE
Private Physician GI Dr. More , PCP Dr. Maisha Gregory  28y f pmh  PMH multiple bowel surgeries, colostomy, open heart surgery as child, Pt adenoma. Pt not on sterods pw c/o abd pain nausea and vomiting past three days. Nv not tolerating po. no fever,cough, PE Adult female awake alert looking acutely ill. Heent normocephalic atraumatic neck supple chest clear anterior & posterior cv no rubs, gallops or murmurs abd +ttp mod, generalized,  neruo no focal defects  Mark Dunbar MD, Facep

## 2023-09-26 DIAGNOSIS — K29.80 DUODENITIS WITHOUT BLEEDING: ICD-10-CM

## 2023-09-26 DIAGNOSIS — D35.2 BENIGN NEOPLASM OF PITUITARY GLAND: ICD-10-CM

## 2023-09-26 DIAGNOSIS — Q52.0 CONGENITAL ABSENCE OF VAGINA: Chronic | ICD-10-CM

## 2023-09-26 DIAGNOSIS — F41.9 ANXIETY DISORDER, UNSPECIFIED: ICD-10-CM

## 2023-09-26 DIAGNOSIS — Q42.3 CONGENITAL ABSENCE, ATRESIA AND STENOSIS OF ANUS WITHOUT FISTULA: Chronic | ICD-10-CM

## 2023-09-26 DIAGNOSIS — Z29.9 ENCOUNTER FOR PROPHYLACTIC MEASURES, UNSPECIFIED: ICD-10-CM

## 2023-09-26 DIAGNOSIS — F39 UNSPECIFIED MOOD [AFFECTIVE] DISORDER: ICD-10-CM

## 2023-09-26 DIAGNOSIS — R10.9 UNSPECIFIED ABDOMINAL PAIN: ICD-10-CM

## 2023-09-26 LAB
MRSA PCR RESULT.: SIGNIFICANT CHANGE UP
S AUREUS DNA NOSE QL NAA+PROBE: DETECTED

## 2023-09-26 PROCEDURE — 99223 1ST HOSP IP/OBS HIGH 75: CPT | Mod: GC

## 2023-09-26 PROCEDURE — 74177 CT ABD & PELVIS W/CONTRAST: CPT | Mod: 26,MA

## 2023-09-26 PROCEDURE — 99222 1ST HOSP IP/OBS MODERATE 55: CPT

## 2023-09-26 PROCEDURE — 93010 ELECTROCARDIOGRAM REPORT: CPT

## 2023-09-26 RX ORDER — ENOXAPARIN SODIUM 100 MG/ML
40 INJECTION SUBCUTANEOUS EVERY 24 HOURS
Refills: 0 | Status: DISCONTINUED | OUTPATIENT
Start: 2023-09-26 | End: 2023-09-26

## 2023-09-26 RX ORDER — ACETAMINOPHEN 500 MG
650 TABLET ORAL EVERY 6 HOURS
Refills: 0 | Status: DISCONTINUED | OUTPATIENT
Start: 2023-09-26 | End: 2023-09-27

## 2023-09-26 RX ORDER — ESCITALOPRAM OXALATE 10 MG/1
20 TABLET, FILM COATED ORAL DAILY
Refills: 0 | Status: DISCONTINUED | OUTPATIENT
Start: 2023-09-26 | End: 2023-09-27

## 2023-09-26 RX ORDER — CABERGOLINE 0.5 MG/1
0.25 TABLET ORAL
Refills: 0 | Status: DISCONTINUED | OUTPATIENT
Start: 2023-09-26 | End: 2023-09-27

## 2023-09-26 RX ORDER — ONDANSETRON 8 MG/1
4 TABLET, FILM COATED ORAL EVERY 8 HOURS
Refills: 0 | Status: DISCONTINUED | OUTPATIENT
Start: 2023-09-26 | End: 2023-09-27

## 2023-09-26 RX ORDER — DIPHENOXYLATE HCL/ATROPINE 2.5-.025MG
1 TABLET ORAL DAILY
Refills: 0 | Status: DISCONTINUED | OUTPATIENT
Start: 2023-09-27 | End: 2023-09-27

## 2023-09-26 RX ORDER — PANTOPRAZOLE SODIUM 20 MG/1
40 TABLET, DELAYED RELEASE ORAL
Refills: 0 | Status: DISCONTINUED | OUTPATIENT
Start: 2023-09-26 | End: 2023-09-27

## 2023-09-26 RX ORDER — MORPHINE SULFATE 50 MG/1
4 CAPSULE, EXTENDED RELEASE ORAL ONCE
Refills: 0 | Status: DISCONTINUED | OUTPATIENT
Start: 2023-09-26 | End: 2023-09-26

## 2023-09-26 RX ORDER — SODIUM CHLORIDE 9 MG/ML
1000 INJECTION, SOLUTION INTRAVENOUS
Refills: 0 | Status: DISCONTINUED | OUTPATIENT
Start: 2023-09-26 | End: 2023-09-27

## 2023-09-26 RX ORDER — ACETAMINOPHEN 500 MG
1000 TABLET ORAL ONCE
Refills: 0 | Status: COMPLETED | OUTPATIENT
Start: 2023-09-26 | End: 2023-09-26

## 2023-09-26 RX ORDER — CABERGOLINE 0.5 MG/1
0.25 TABLET ORAL
Refills: 0 | Status: DISCONTINUED | OUTPATIENT
Start: 2023-09-26 | End: 2023-09-26

## 2023-09-26 RX ORDER — BUPROPION HYDROCHLORIDE 150 MG/1
300 TABLET, EXTENDED RELEASE ORAL DAILY
Refills: 0 | Status: DISCONTINUED | OUTPATIENT
Start: 2023-09-26 | End: 2023-09-27

## 2023-09-26 RX ORDER — CHLORHEXIDINE GLUCONATE 213 G/1000ML
1 SOLUTION TOPICAL
Refills: 0 | Status: DISCONTINUED | OUTPATIENT
Start: 2023-09-26 | End: 2023-09-27

## 2023-09-26 RX ORDER — LANOLIN ALCOHOL/MO/W.PET/CERES
3 CREAM (GRAM) TOPICAL AT BEDTIME
Refills: 0 | Status: DISCONTINUED | OUTPATIENT
Start: 2023-09-26 | End: 2023-09-27

## 2023-09-26 RX ORDER — MORPHINE SULFATE 50 MG/1
2 CAPSULE, EXTENDED RELEASE ORAL ONCE
Refills: 0 | Status: DISCONTINUED | OUTPATIENT
Start: 2023-09-26 | End: 2023-09-26

## 2023-09-26 RX ADMIN — SODIUM CHLORIDE 75 MILLILITER(S): 9 INJECTION, SOLUTION INTRAVENOUS at 22:27

## 2023-09-26 RX ADMIN — ONDANSETRON 4 MILLIGRAM(S): 8 TABLET, FILM COATED ORAL at 12:32

## 2023-09-26 RX ADMIN — MORPHINE SULFATE 4 MILLIGRAM(S): 50 CAPSULE, EXTENDED RELEASE ORAL at 01:09

## 2023-09-26 RX ADMIN — ONDANSETRON 4 MILLIGRAM(S): 8 TABLET, FILM COATED ORAL at 22:31

## 2023-09-26 RX ADMIN — ESCITALOPRAM OXALATE 20 MILLIGRAM(S): 10 TABLET, FILM COATED ORAL at 16:31

## 2023-09-26 RX ADMIN — SODIUM CHLORIDE 75 MILLILITER(S): 9 INJECTION, SOLUTION INTRAVENOUS at 16:30

## 2023-09-26 RX ADMIN — CABERGOLINE 0.25 MILLIGRAM(S): 0.5 TABLET ORAL at 18:34

## 2023-09-26 RX ADMIN — BUPROPION HYDROCHLORIDE 300 MILLIGRAM(S): 150 TABLET, EXTENDED RELEASE ORAL at 16:30

## 2023-09-26 RX ADMIN — ENOXAPARIN SODIUM 40 MILLIGRAM(S): 100 INJECTION SUBCUTANEOUS at 12:32

## 2023-09-26 RX ADMIN — Medication 1000 MILLIGRAM(S): at 17:33

## 2023-09-26 RX ADMIN — Medication 400 MILLIGRAM(S): at 16:30

## 2023-09-26 RX ADMIN — PANTOPRAZOLE SODIUM 40 MILLIGRAM(S): 20 TABLET, DELAYED RELEASE ORAL at 16:38

## 2023-09-26 NOTE — CONSULT NOTE ADULT - SUBJECTIVE AND OBJECTIVE BOX
Chief Complaint:  Patient is a 28y old  Female who presents with a chief complaint of vomiting (26 Sep 2023 12:54)      HPI:  28-year-old female with PMHx of multiple bowel surgeries, s/p colostomy with revision (1995), open heart surgery for ASD, Mullerian agenesis, IBS, pituitary adenoma (dx in 2020) presents for 3 days of epigastric abdominal pain associated with nausea and vomiting. Patient endorses emesis since Saturday and being unable to keep any food down described as brown in color, not coffee ground. Patient said it  started after eating a lot of duck on Saturday. Associated symptoms include chills and chest discomfort that feels like acid reflux exacerbated after vomiting. Patient denies fevers, palpitations, URI symptoms, diarrhea, hematochezia, melena.     In the ED, VS with 153/103, HR 68, afebrile, satting well on RA.   Patient was recently admitted for pseudomonal UTI on 8/2023 treated with 5 days of cefepime/flagyl.  (26 Sep 2023 12:54)      Allergies:  Bactrim (Rash)  trazodone (Rash)  Geodon (Rash)  Viberzi (Other)      Home Medications:    Hospital Medications:  acetaminophen     Tablet .. 650 milliGRAM(s) Oral every 6 hours PRN  acetaminophen   IVPB .. 1000 milliGRAM(s) IV Intermittent once  aluminum hydroxide/magnesium hydroxide/simethicone Suspension 30 milliLiter(s) Oral every 4 hours PRN  buPROPion XL (24-Hour) . 300 milliGRAM(s) Oral daily  chlorhexidine 4% Liquid 1 Application(s) Topical <User Schedule>  diphenoxylate/atropine 1 Tablet(s) Oral daily  enoxaparin Injectable 40 milliGRAM(s) SubCutaneous every 24 hours  escitalopram 20 milliGRAM(s) Oral daily  lactated ringers. 1000 milliLiter(s) IV Continuous <Continuous>  melatonin 3 milliGRAM(s) Oral at bedtime PRN  ondansetron Injectable 4 milliGRAM(s) IV Push every 8 hours PRN  pantoprazole  Injectable 40 milliGRAM(s) IV Push two times a day      PMHX/PSHX:  History of open heart surgery    IBS (irritable bowel syndrome)    S/P colostomy    Pituitary adenoma    S/P partial resection of colon    Anal atresia    Mullerian agenesis        Family history:      Denies family history of colon cancer/polyps, stomach cancer/polyps, pancreatic cancer/masses, liver cancer/disease, ovarian cancer and endometrial cancer.    Social History:     Tob: Denies  EtOH: Denies  Illicit Drugs: Denies    ROS:     General:  No wt loss, fevers, chills  ENT:  No dysphagia  CV:  No pain, palpitations  Pulm:  No dyspnea, cough  GI:  No pain, No nausea, No vomiting, No diarrhea, No constipation, No rectal bleeding, No tarry stools, No dysphagia,  Muscle:  No pain, weakness  Neuro:  No weakness  Heme:  No ecchymosis  Skin:  No rash    PHYSICAL EXAM:     GENERAL:  No acute distress  HEENT:  no scleral icterus  CHEST:  no accessory muscle use  HEART:  Regular rate and rhythm  ABDOMEN:  Soft, non-tender, non-distended  EXTREMITIES: No edema  SKIN:  No rash/ecchymoses  NEURO:  Alert and oriented x 3    Vital Signs:  Vital Signs Last 24 Hrs  T(C): 36.9 (26 Sep 2023 04:15), Max: 36.9 (25 Sep 2023 16:45)  T(F): 98.4 (26 Sep 2023 04:15), Max: 98.5 (25 Sep 2023 16:45)  HR: 61 (26 Sep 2023 03:30) (61 - 67)  BP: 129/82 (26 Sep 2023 04:15) (106/64 - 135/74)  BP(mean): 91 (26 Sep 2023 03:30) (91 - 91)  RR: 18 (26 Sep 2023 04:15) (17 - 20)  SpO2: 98% (26 Sep 2023 04:15) (98% - 100%)    Parameters below as of 26 Sep 2023 04:15  Patient On (Oxygen Delivery Method): room air      Daily Height in cm: 162.56 (26 Sep 2023 04:15)    Daily     LABS:                        13.8   12.22 )-----------( 284      ( 25 Sep 2023 21:31 )             40.8     Mean Cell Volume: 85.5 fl (09-25-23 @ 21:31)    09-25    138  |  100  |  7   ----------------------------<  132<H>  3.7   |  22  |  0.71    Ca    9.9      25 Sep 2023 21:31    TPro  7.8  /  Alb  4.8  /  TBili  0.7  /  DBili  x   /  AST  13  /  ALT  8<L>  /  AlkPhos  67  09-25    LIVER FUNCTIONS - ( 25 Sep 2023 21:31 )  Alb: 4.8 g/dL / Pro: 7.8 g/dL / ALK PHOS: 67 U/L / ALT: 8 U/L / AST: 13 U/L / GGT: x             Urinalysis Basic - ( 25 Sep 2023 21:31 )    Color: x / Appearance: x / SG: x / pH: x  Gluc: 132 mg/dL / Ketone: x  / Bili: x / Urobili: x   Blood: x / Protein: x / Nitrite: x   Leuk Esterase: x / RBC: x / WBC x   Sq Epi: x / Non Sq Epi: x / Bacteria: x      Amylase Serum--      Lipase serum19       Ammonia--                          13.8   12.22 )-----------( 284      ( 25 Sep 2023 21:31 )             40.8

## 2023-09-26 NOTE — H&P ADULT - HISTORY OF PRESENT ILLNESS
28-year-old female with PMHx of multiple bowel surgeries, s/p colostomy with revision (1995), open heart surgery for ASD, Mullerian agenesis, IBS, pituitary adenoma (dx in 2020) presents for 3 days of epigastric abdominal pain associated with nausea and vomiting. Patient endorses emesis since Saturday and being unable to keep any food down described as brown in color, not coffee ground. Patient said it  started after eating a lot of duck on Saturday. Associated symptoms include chills and chest discomfort that feels like acid reflux exacerbated after vomiting. Patient denies fevers, palpitations, URI symptoms, diarrhea, hematochezia, melena.     In the ED, VS with 153/103, HR 68, afebrile, satting well on RA.   Patient was recently admitted for pseudomonal UTI on 8/2023 treated with 5 days of cefepime/flagyl.

## 2023-09-26 NOTE — H&P ADULT - NSHPPHYSICALEXAM_GEN_ALL_CORE
VITALS:   T(C): 36.9 (09-26-23 @ 04:15), Max: 36.9 (09-25-23 @ 16:45)  HR: 61 (09-26-23 @ 03:30) (61 - 67)  BP: 129/82 (09-26-23 @ 04:15) (106/64 - 135/74)  RR: 18 (09-26-23 @ 04:15) (17 - 20)  SpO2: 98% (09-26-23 @ 04:15) (98% - 100%)    GENERAL: in moderate discomfort, appears stated age   HEAD:  Atraumatic, normocephalic  EYES: EOMI, PERRLA, conjunctiva and sclera clear  ENT: Moist mucous membranes  NECK: Supple, no JVD  HEART: Regular rate and rhythm, no murmurs, rubs, or gallops  LUNGS: Unlabored respirations.  Clear to auscultation bilaterally, no crackles, wheezing, or rhonchi  ABDOMEN: + generalized tenderness, nondistended, +BS  EXTREMITIES: 2+ peripheral pulses bilaterally. No clubbing, cyanosis, or edema  NERVOUS SYSTEM:  A&Ox3, no focal deficits   SKIN: No rashes or lesions

## 2023-09-26 NOTE — PATIENT PROFILE ADULT - FALL HARM RISK - UNIVERSAL INTERVENTIONS
Bed in lowest position, wheels locked, appropriate side rails in place/Call bell, personal items and telephone in reach/Instruct patient to call for assistance before getting out of bed or chair/Non-slip footwear when patient is out of bed/[place] to call system/Physically safe environment - no spills, clutter or unnecessary equipment/Purposeful Proactive Rounding/Room/bathroom lighting operational, light cord in reach

## 2023-09-26 NOTE — H&P ADULT - NSICDXPASTMEDICALHX_GEN_ALL_CORE_FT
PAST MEDICAL HISTORY:  History of open heart surgery     IBS (irritable bowel syndrome)     Pituitary adenoma     S/P partial resection of colon

## 2023-09-26 NOTE — CONSULT NOTE ADULT - ASSESSMENT
28-year-old female with PMHx of multiple bowel surgeries, s/p colostomy with revision (1995), open heart surgery for ASD, Mullerian agenesis, IBS, pituitary adenoma (dx in 2020) admitted with epigastric pain associated with nausea/vomiting    Impression  #epigastric pain with NBNB emesis; ddx broad including PUD given CT findings of duodenitis vs. gastritis vs. less likely malignancy  - 3day hx of epigastric pain w/vomiting; no previous episodes  - previous EGD 2017 with esophageal polyp (report not available for review)  - no significant NSAID hx;   - CT A/P with duodenitis; normal stomach and pancreas  - RUQ US without gallstones or findings concerning for acute chester    #hx of Mullerian agenesis s/p multiple bowel surgeries    Recommendations  - agree with PPI BID for now  - plan for EGD tomorrow  - NPO after midnight  - 4AM labs  - hold AC tonight    Note incomplete until attested by attending.    Frida Flor MD  GI/Hepatology Fellow, PGY4  Teams preferred (7AM to 5PM); after 5PM, call GI fellow on call    NEW CONSULTS:  Please email [email or web address]

## 2023-09-26 NOTE — H&P ADULT - ASSESSMENT
28-year-old female with PMHx of multiple bowel surgeries, s/p colostomy with revision (1995), open heart surgery for ASD, Mullerian agenesis, IBS, pituitary adenoma (dx in 2020) presents for 3 days of epigastric abdominal pain associated with nausea and NBNB vomiting with imaging significant for duodenal inflammation. Patient is admitted for further work up.

## 2023-09-26 NOTE — H&P ADULT - ATTENDING COMMENTS
27 yo F with hx ASD s/p repair, pituitary adenoma, cloaca malformation s/p multiple abd surgeries who presented with abd pain, N/V. CT abd/ pelvis w/o SBO or ileus, but showed thickening c/f duodenitis.   Patient with nausea, drinking small amounts of liquids but feels like vomiting even while speaking.     #Duodenitis  #Depression  #Pituitary adenoma     - Consult GI to consider EGD  - PPI BID, send h pylori stool testing  - Monitor off abx, trend leukocytosis. Afebrile   - continue home Lexapro, Wellbutrin  - continue home lamotil. Has profuse diarrhea without it   - No HA, vision changes     Rest as above.

## 2023-09-26 NOTE — H&P ADULT - NSHPREVIEWOFSYSTEMS_GEN_ALL_CORE
REVIEW OF SYSTEMS:    CONSTITUTIONAL: No weakness, fevers, + chills  EYES/ENT: No visual changes;  No vertigo or throat pain, + motion sickness intermittent    NECK: No pain or stiffness  RESPIRATORY: No cough, wheezing, hemoptysis; No shortness of breath  CARDIOVASCULAR: No chest pain or palpitations  GASTROINTESTINAL: + epigastric pain. + nausea, vomiting, or hematemesis; No diarrhea or constipation. No melena or hematochezia.  GENITOURINARY: No dysuria, frequency or hematuria  NEUROLOGICAL: No numbness or weakness  SKIN: No itching, rashes

## 2023-09-26 NOTE — CONSULT NOTE ADULT - ATTENDING COMMENTS
Patient seen and examined. Agree w above. Mom at bedside. 29 yo w hx of multiple abdominal surgeries presents w acute onset epigastric abdominal pain w N/V. CT w duodenitis. Previous EGD 2017 in [place] w esophageal polyp as per patient. Rec EGD. Risks/benefits explained.

## 2023-09-26 NOTE — H&P ADULT - PROBLEM SELECTOR PLAN 1
4 days of NBNB vomiting iso IBS, hx of multiple GI surgeries.   CT a/p remarkable for duodenitis   Labs remarkable for mild leukocytosis. VSS   - f/u Bcx, Ucx   - f/u H pylori labs   - f/u GI PCR   - started on PPI 40 IV BID   - started on zofran 4 mg q8h PRN   - emailed GI - f/u recs   - holding off on abx   - CTM daily CBC  - c/w lomotil home med  - obtain collateral from Dr. More 4 days of NBNB vomiting iso IBS, hx of multiple GI surgeries.   CT a/p remarkable for duodenitis   RUQ US without cholelithiasis, no duct dilation, gallbladder wnl   Labs remarkable for mild leukocytosis. VSS   - f/u Bcx, Ucx   - f/u H pylori labs   - f/u GI PCR   - started on PPI 40 IV BID   - started on zofran 4 mg q8h PRN   - emailed GI - f/u recs   - holding off on abx   - CTM daily CBC  - c/w [facility] med  - obtain collateral from Dr. More

## 2023-09-27 ENCOUNTER — TRANSCRIPTION ENCOUNTER (OUTPATIENT)
Age: 28
End: 2023-09-27

## 2023-09-27 VITALS
SYSTOLIC BLOOD PRESSURE: 98 MMHG | RESPIRATION RATE: 18 BRPM | OXYGEN SATURATION: 98 % | DIASTOLIC BLOOD PRESSURE: 68 MMHG | TEMPERATURE: 98 F | HEART RATE: 58 BPM

## 2023-09-27 LAB
ALBUMIN SERPL ELPH-MCNC: 4.4 G/DL — SIGNIFICANT CHANGE UP (ref 3.3–5)
ALP SERPL-CCNC: 61 U/L — SIGNIFICANT CHANGE UP (ref 40–120)
ALT FLD-CCNC: 9 U/L — LOW (ref 10–45)
ANION GAP SERPL CALC-SCNC: 13 MMOL/L — SIGNIFICANT CHANGE UP (ref 5–17)
APTT BLD: 30.3 SEC — SIGNIFICANT CHANGE UP (ref 24.5–35.6)
AST SERPL-CCNC: 11 U/L — SIGNIFICANT CHANGE UP (ref 10–40)
BILIRUB SERPL-MCNC: 0.8 MG/DL — SIGNIFICANT CHANGE UP (ref 0.2–1.2)
BUN SERPL-MCNC: 8 MG/DL — SIGNIFICANT CHANGE UP (ref 7–23)
CALCIUM SERPL-MCNC: 9 MG/DL — SIGNIFICANT CHANGE UP (ref 8.4–10.5)
CHLORIDE SERPL-SCNC: 98 MMOL/L — SIGNIFICANT CHANGE UP (ref 96–108)
CO2 SERPL-SCNC: 23 MMOL/L — SIGNIFICANT CHANGE UP (ref 22–31)
CREAT SERPL-MCNC: 0.6 MG/DL — SIGNIFICANT CHANGE UP (ref 0.5–1.3)
EGFR: 125 ML/MIN/1.73M2 — SIGNIFICANT CHANGE UP
GLUCOSE SERPL-MCNC: 103 MG/DL — HIGH (ref 70–99)
HCT VFR BLD CALC: 38.3 % — SIGNIFICANT CHANGE UP (ref 34.5–45)
HGB BLD-MCNC: 13 G/DL — SIGNIFICANT CHANGE UP (ref 11.5–15.5)
INR BLD: 1.08 RATIO — SIGNIFICANT CHANGE UP (ref 0.85–1.18)
MAGNESIUM SERPL-MCNC: 1.9 MG/DL — SIGNIFICANT CHANGE UP (ref 1.6–2.6)
MCHC RBC-ENTMCNC: 29.1 PG — SIGNIFICANT CHANGE UP (ref 27–34)
MCHC RBC-ENTMCNC: 33.9 GM/DL — SIGNIFICANT CHANGE UP (ref 32–36)
MCV RBC AUTO: 85.7 FL — SIGNIFICANT CHANGE UP (ref 80–100)
NRBC # BLD: 0 /100 WBCS — SIGNIFICANT CHANGE UP (ref 0–0)
PHOSPHATE SERPL-MCNC: 2.6 MG/DL — SIGNIFICANT CHANGE UP (ref 2.5–4.5)
PLATELET # BLD AUTO: 289 K/UL — SIGNIFICANT CHANGE UP (ref 150–400)
POTASSIUM SERPL-MCNC: 3.7 MMOL/L — SIGNIFICANT CHANGE UP (ref 3.5–5.3)
POTASSIUM SERPL-SCNC: 3.7 MMOL/L — SIGNIFICANT CHANGE UP (ref 3.5–5.3)
PROT SERPL-MCNC: 7.1 G/DL — SIGNIFICANT CHANGE UP (ref 6–8.3)
PROTHROM AB SERPL-ACNC: 11.3 SEC — SIGNIFICANT CHANGE UP (ref 9.5–13)
RBC # BLD: 4.47 M/UL — SIGNIFICANT CHANGE UP (ref 3.8–5.2)
RBC # FLD: 12 % — SIGNIFICANT CHANGE UP (ref 10.3–14.5)
SODIUM SERPL-SCNC: 134 MMOL/L — LOW (ref 135–145)
WBC # BLD: 10.55 K/UL — HIGH (ref 3.8–10.5)
WBC # FLD AUTO: 10.55 K/UL — HIGH (ref 3.8–10.5)

## 2023-09-27 PROCEDURE — 43239 EGD BIOPSY SINGLE/MULTIPLE: CPT | Mod: GC

## 2023-09-27 PROCEDURE — 99239 HOSP IP/OBS DSCHRG MGMT >30: CPT | Mod: GC

## 2023-09-27 PROCEDURE — 88305 TISSUE EXAM BY PATHOLOGIST: CPT | Mod: 26

## 2023-09-27 RX ORDER — MUPIROCIN 20 MG/G
1 OINTMENT TOPICAL
Refills: 0 | Status: DISCONTINUED | OUTPATIENT
Start: 2023-09-27 | End: 2023-09-27

## 2023-09-27 RX ORDER — PANTOPRAZOLE SODIUM 20 MG/1
40 TABLET, DELAYED RELEASE ORAL
Refills: 0 | Status: DISCONTINUED | OUTPATIENT
Start: 2023-09-27 | End: 2023-09-27

## 2023-09-27 RX ORDER — LIDOCAINE HCL 20 MG/ML
4 VIAL (ML) INJECTION ONCE
Refills: 0 | Status: COMPLETED | OUTPATIENT
Start: 2023-09-27 | End: 2023-09-27

## 2023-09-27 RX ORDER — SODIUM CHLORIDE 9 MG/ML
500 INJECTION INTRAMUSCULAR; INTRAVENOUS; SUBCUTANEOUS
Refills: 0 | Status: COMPLETED | OUTPATIENT
Start: 2023-09-27 | End: 2023-09-27

## 2023-09-27 RX ORDER — SODIUM CHLORIDE 9 MG/ML
1000 INJECTION, SOLUTION INTRAVENOUS ONCE
Refills: 0 | Status: COMPLETED | OUTPATIENT
Start: 2023-09-27 | End: 2023-09-27

## 2023-09-27 RX ORDER — PANTOPRAZOLE SODIUM 20 MG/1
1 TABLET, DELAYED RELEASE ORAL
Qty: 30 | Refills: 0
Start: 2023-09-27 | End: 2023-10-26

## 2023-09-27 RX ADMIN — Medication 4 MILLILITER(S): at 09:56

## 2023-09-27 RX ADMIN — MUPIROCIN 1 APPLICATION(S): 20 OINTMENT TOPICAL at 17:51

## 2023-09-27 RX ADMIN — SODIUM CHLORIDE 1000 MILLILITER(S): 9 INJECTION, SOLUTION INTRAVENOUS at 12:04

## 2023-09-27 RX ADMIN — SODIUM CHLORIDE 75 MILLILITER(S): 9 INJECTION, SOLUTION INTRAVENOUS at 06:33

## 2023-09-27 RX ADMIN — ONDANSETRON 4 MILLIGRAM(S): 8 TABLET, FILM COATED ORAL at 06:50

## 2023-09-27 RX ADMIN — ESCITALOPRAM OXALATE 20 MILLIGRAM(S): 10 TABLET, FILM COATED ORAL at 12:05

## 2023-09-27 RX ADMIN — BUPROPION HYDROCHLORIDE 300 MILLIGRAM(S): 150 TABLET, EXTENDED RELEASE ORAL at 12:05

## 2023-09-27 RX ADMIN — SODIUM CHLORIDE 30 MILLILITER(S): 9 INJECTION INTRAMUSCULAR; INTRAVENOUS; SUBCUTANEOUS at 10:11

## 2023-09-27 RX ADMIN — PANTOPRAZOLE SODIUM 40 MILLIGRAM(S): 20 TABLET, DELAYED RELEASE ORAL at 06:32

## 2023-09-27 RX ADMIN — Medication 1 TABLET(S): at 12:07

## 2023-09-27 RX ADMIN — CHLORHEXIDINE GLUCONATE 1 APPLICATION(S): 213 SOLUTION TOPICAL at 06:32

## 2023-09-27 RX ADMIN — PANTOPRAZOLE SODIUM 40 MILLIGRAM(S): 20 TABLET, DELAYED RELEASE ORAL at 12:05

## 2023-09-27 NOTE — DISCHARGE NOTE PROVIDER - NSDCCPCAREPLAN_GEN_ALL_CORE_FT
PRINCIPAL DISCHARGE DIAGNOSIS  Diagnosis: Abdominal pain with vomiting  Assessment and Plan of Treatment: You presented to the hospital with nausea, vomiting, and abdominal pain. You were started on IV pantoprazole while you were in the hospital. A CT abdomen and pelvis was performed which showed evidence of inflammation of your GI tract. GI was consulted and performed an endoscopy on 9/27 with evidence of gastritis (inflammation of the stomach). Your abdominal pain continued to improve and your nausea ultimated resolved. As a result you were able to tolerate a regular diet. Baased on your improved symptoms, you were deemed stable and ready for discharge. You will be sent home on pantoprazole 40mg once daily before breakfast. This medication will help to reduce the inflammation in your stomach. Please follow up with GI outpatient for further management and to discuss the results of the biopsy when they are back. Please follow up with your PCP within 2 weeks of discharge.   If you experience worsening abdominal pain, nausea, vomiting, fevers, chills, diarrhea, or bloody bowel movements, please seek medical care immediately.

## 2023-09-27 NOTE — DISCHARGE NOTE PROVIDER - NSFOLLOWUPCLINICS_GEN_ALL_ED_FT
Gastroenterology at [facility]  Gastroenterology  [address] [place], [place] [address]  Phone: [phone]  Fax:   Follow Up Time: 1 week

## 2023-09-27 NOTE — DISCHARGE NOTE PROVIDER - NSDCFUADDAPPT_GEN_ALL_CORE_FT
APPTS ARE READY TO BE MADE: [ ] YES    Best Family or Patient Contact (if needed):    Additional Information about above appointments (if needed):    1: Please schedule appointment with gastroenterology within a week at [phone]  2:   3:     Other comments or requests:    APPTS ARE READY TO BE MADE: [x] YES    Best Family or Patient Contact (if needed):    Additional Information about above appointments (if needed):    1: Please schedule appointment with gastroenterology within a week at [phone]  2:   3:     Other comments or requests:    APPTS ARE READY TO BE MADE: [x] YES    Best Family or Patient Contact (if needed):    Additional Information about above appointments (if needed):    1: Please schedule appointment with gastroenterology within a week at [phone]  2:   3:     Other comments or requests:   Patient advised they are established with a different provider not listed on the referral and will follow up on their own.

## 2023-09-27 NOTE — PROGRESS NOTE ADULT - ATTENDING COMMENTS
Patient feels much better this AM, no further vomiting.  EGD w/ gastritis, biopsies taken.  Advance diet as tolerated. If no issues can be dc'ed home today.  Will f/u with her outpt gastro and we will provide our clinic as well.  Time spent on discharge: 36 minutes

## 2023-09-27 NOTE — DISCHARGE NOTE PROVIDER - NSDCMRMEDTOKEN_GEN_ALL_CORE_FT
buPROPion 300 mg/24 hours (XL) oral tablet, extended release: 1 tab(s) orally once a day (in the morning) once daily  cabergoline 0.5 mg oral tablet: 0.5 tab(s) orally 2 times a week  Lexapro 20 mg oral tablet: 1 tab(s) orally once a day (at bedtime)  Lomotil 2.5 mg-0.025 mg oral tablet: 1 tab(s) orally once a day   buPROPion 300 mg/24 hours (XL) oral tablet, extended release: 1 tab(s) orally once a day (in the morning) once daily  cabergoline 0.5 mg oral tablet: 0.5 tab(s) orally 2 times a week  Lexapro 20 mg oral tablet: 1 tab(s) orally once a day (at bedtime)  Lomotil 2.5 mg-0.025 mg oral tablet: 1 tab(s) orally once a day  pantoprazole 40 mg oral delayed release tablet: 1 tab(s) orally once a day (before a meal)

## 2023-09-27 NOTE — PROGRESS NOTE ADULT - PROBLEM SELECTOR PLAN 4
- Diet: clears advance as tolerated   - DVT proph: lovenox  - Dispo: pending medical clearance - Diet: advanced to regular   - DVT proph: lovenox  - Dispo: home

## 2023-09-27 NOTE — DISCHARGE NOTE PROVIDER - HOSPITAL COURSE
28-year-old female with PMHx of multiple bowel surgeries, s/p colostomy with revision (1995), open heart surgery for ASD, Mullerian agenesis, IBS, pituitary adenoma (dx in 2020) presents for 3 days of epigastric abdominal pain associated with nausea and vomiting. Patient endorses emesis since Saturday and being unable to keep any food down described as brown in color, not coffee ground. Patient said it  started after eating a lot of duck on Saturday. Associated symptoms include chills and chest discomfort that feels like acid reflux exacerbated after vomiting. Patient denied fevers, palpitations, URI symptoms, diarrhea, hematochezia, melena. Patient was recently admitted for pseudomonal UTI on 8/2023 treated with 5 days of cefepime/flagyl.     In the ED, VS with 153/103, HR 68, afebrile, satting well on RA. Patient was treated with 40 mg pantoprazole IV BID and IV tylenol for pain with improvement in symptoms. Patient received endoscopy on 9/27 with findings of gastritis and no evidence of ulceration. Patient's diet was advanced and tolerated well before discharge.     Patient's condition is stable upon discharge. Patient will continue with pantoprazole 40 mg once daily by mouth. Follow up within a week with outpatient GI.   28-year-old female with PMHx of multiple bowel surgeries, s/p colostomy with revision (1995), open heart surgery for ASD, Mullerian agenesis, IBS, pituitary adenoma (dx in 2020) presents for 3 days of epigastric abdominal pain associated with nausea and vomiting. Patient endorses emesis since Saturday and being unable to keep any food down described as brown in color, not coffee ground. Patient said it  started after eating a lot of duck on Saturday. Associated symptoms include chills and chest discomfort that feels like acid reflux exacerbated after vomiting. Patient denied fevers, palpitations, URI symptoms, diarrhea, hematochezia, melena. Patient was recently admitted for pseudomonal UTI on 8/2023 treated with 5 days of cefepime/flagyl.     In the ED, VS with 153/103, HR 68, afebrile, satting well on RA. Patient was treated with 40 mg pantoprazole IV BID and IV tylenol for pain with improvement in symptoms. Patient received endoscopy on 9/27 with findings of gastritis and no evidence of ulceration. Patient's diet was advanced and tolerated well before discharge.     Patient's condition is stable upon discharge. Patient will continue with pantoprazole 40 mg once daily by mouth. Follow up within a week with outpatient GI.    Discharge follow ups:  Follow up GI outpatient  Follow up EGD biopsy results

## 2023-09-27 NOTE — DISCHARGE NOTE NURSING/CASE MANAGEMENT/SOCIAL WORK - NSDCFUADDAPPT_GEN_ALL_CORE_FT
APPTS ARE READY TO BE MADE: [x] YES    Best Family or Patient Contact (if needed):    Additional Information about above appointments (if needed):    1: Please schedule appointment with gastroenterology within a week at [phone]  2:   3:     Other comments or requests:

## 2023-09-27 NOTE — DISCHARGE NOTE PROVIDER - NSDCCPTREATMENT_GEN_ALL_CORE_FT
PRINCIPAL PROCEDURE  Procedure: Endoscopy, upper GI tract  Findings and Treatment: Findings:       The examined esophagus was normal.       Patchy mild inflammation characterized by congestion (edema), erosions and erythema was found        in the gastric antrum. Biopsies were taken with a cold forceps for Helicobacter pylori        testing.       The exam of the stomach was otherwise normal.       The first portion of the duodenum and second portion of the duodenum were normal.                                                                Impression:            - Normal esophagus.   - Gastritis. Biopsied.   - Normal first portion of the duodenum and second portion of the duodenum.

## 2023-09-27 NOTE — PRE-OP CHECKLIST - WEIGHT IN LBS

## 2023-09-27 NOTE — PRE-ANESTHESIA EVALUATION ADULT - NSANTHOSAYNRD_GEN_A_CORE
No. KAMAR screening performed.  STOP BANG Legend: 0-2 = LOW Risk; 3-4 = INTERMEDIATE Risk; 5-8 = HIGH Risk

## 2023-09-27 NOTE — PROGRESS NOTE ADULT - SUBJECTIVE AND OBJECTIVE BOX
Preeti Hernandez MD    Internal Medicine Resident (PGY-1)  ___________________________________________________________________________________________________      ROSS DIGGS 28y Female    Overnight events/subjective: No acute overnight events. Patient seen and examined at bedside. No complaints. Denies fever, chills, chest pain, shortness of breath, abdominal pain, nausea, vomiting, changes in bowel habits, or urinary symptoms.    Vital Signs Last 24 Hrs  T(C): 36.8 (27 Sep 2023 04:13), Max: 37.1 (26 Sep 2023 16:44)  T(F): 98.2 (27 Sep 2023 04:13), Max: 98.8 (26 Sep 2023 16:44)  HR: 55 (27 Sep 2023 04:13) (55 - 68)  BP: 127/73 (27 Sep 2023 04:13) (127/73 - 137/84)  BP(mean): --  RR: 17 (27 Sep 2023 04:13) (17 - 17)  SpO2: 97% (27 Sep 2023 04:13) (97% - 99%)    Parameters below as of 27 Sep 2023 04:13  Patient On (Oxygen Delivery Method): room air      PHYSICAL EXAM:  GENERAL: in moderate discomfort, appears stated age   HEAD:  Atraumatic, normocephalic  EYES: EOMI, PERRLA, conjunctiva and sclera clear  ENT: Moist mucous membranes  NECK: Supple, no JVD  HEART: Regular rate and rhythm, no murmurs, rubs, or gallops  LUNGS: Unlabored respirations.  Clear to auscultation bilaterally, no crackles, wheezing, or rhonchi  ABDOMEN: + generalized tenderness, nondistended, +BS  EXTREMITIES: 2+ peripheral pulses bilaterally. No clubbing, cyanosis, or edema  NERVOUS SYSTEM:  A&Ox3, no focal deficits   SKIN: No rashes or lesions      HOSPITAL MEDICATIONS:  MEDICATIONS  (STANDING):  buPROPion XL (24-Hour) . 300 milliGRAM(s) Oral daily  cabergoline 0.25 milliGRAM(s) Oral <User Schedule>  chlorhexidine 4% Liquid 1 Application(s) Topical <User Schedule>  diphenoxylate/atropine 1 Tablet(s) Oral daily  escitalopram 20 milliGRAM(s) Oral daily  lactated ringers. 1000 milliLiter(s) (75 mL/Hr) IV Continuous <Continuous>  pantoprazole  Injectable 40 milliGRAM(s) IV Push two times a day    MEDICATIONS  (PRN):  acetaminophen     Tablet .. 650 milliGRAM(s) Oral every 6 hours PRN Temp greater or equal to 38C (100.4F), Mild Pain (1 - 3)  aluminum hydroxide/magnesium hydroxide/simethicone Suspension 30 milliLiter(s) Oral every 4 hours PRN Dyspepsia  melatonin 3 milliGRAM(s) Oral at bedtime PRN Insomnia  ondansetron Injectable 4 milliGRAM(s) IV Push every 8 hours PRN Nausea and/or Vomiting      LABS:                        13.0   10.55 )-----------( 289      ( 27 Sep 2023 04:11 )             38.3     09-27    134<L>  |  98  |  8   ----------------------------<  103<H>  3.7   |  23  |  0.60    Ca    9.0      27 Sep 2023 04:11  Phos  2.6     09-27  Mg     1.9     09-27    TPro  7.1  /  Alb  4.4  /  TBili  0.8  /  DBili  x   /  AST  11  /  ALT  9<L>  /  AlkPhos  61  09-27    PT/INR - ( 27 Sep 2023 04:11 )   PT: 11.3 sec;   INR: 1.08 ratio         PTT - ( 27 Sep 2023 04:11 )  PTT:30.3 sec  Urinalysis Basic - ( 27 Sep 2023 04:11 )    Color: x / Appearance: x / SG: x / pH: x  Gluc: 103 mg/dL / Ketone: x  / Bili: x / Urobili: x   Blood: x / Protein: x / Nitrite: x   Leuk Esterase: x / RBC: x / WBC x   Sq Epi: x / Non Sq Epi: x / Bacteria: x         Preeti Hernandez MD    Internal Medicine Resident (PGY-1)  ___________________________________________________________________________________________________      ROSS DIGGS 28y Female    Overnight events/subjective: No acute overnight events. Patient seen and examined at bedside. Patient's pain is significantly improved. She was able to tolerate soup yesterday evening. No vomiting or nausea this morning. Denies shortness of breath, abdominal pain, nausea, vomiting, changes in bowel habits, or urinary symptoms.    Vital Signs Last 24 Hrs  T(C): 36.8 (27 Sep 2023 04:13), Max: 37.1 (26 Sep 2023 16:44)  T(F): 98.2 (27 Sep 2023 04:13), Max: 98.8 (26 Sep 2023 16:44)  HR: 55 (27 Sep 2023 04:13) (55 - 68)  BP: 127/73 (27 Sep 2023 04:13) (127/73 - 137/84)  BP(mean): --  RR: 17 (27 Sep 2023 04:13) (17 - 17)  SpO2: 97% (27 Sep 2023 04:13) (97% - 99%)    Parameters below as of 27 Sep 2023 04:13  Patient On (Oxygen Delivery Method): room air      PHYSICAL EXAM:  GENERAL: in moderate discomfort, appears stated age   HEAD:  Atraumatic, normocephalic  EYES: EOMI, PERRLA, conjunctiva and sclera clear  ENT: Moist mucous membranes  NECK: Supple, no JVD  HEART: Regular rate and rhythm, no murmurs, rubs, or gallops  LUNGS: Unlabored respirations.  Clear to auscultation bilaterally, no crackles, wheezing, or rhonchi  ABDOMEN: + generalized tenderness, nondistended, +BS  EXTREMITIES: 2+ peripheral pulses bilaterally. No clubbing, cyanosis, or edema  NERVOUS SYSTEM:  A&Ox3, no focal deficits   SKIN: No rashes or lesions      HOSPITAL MEDICATIONS:  MEDICATIONS  (STANDING):  buPROPion XL (24-Hour) . 300 milliGRAM(s) Oral daily  cabergoline 0.25 milliGRAM(s) Oral <User Schedule>  chlorhexidine 4% Liquid 1 Application(s) Topical <User Schedule>  diphenoxylate/atropine 1 Tablet(s) Oral daily  escitalopram 20 milliGRAM(s) Oral daily  lactated ringers. 1000 milliLiter(s) (75 mL/Hr) IV Continuous <Continuous>  pantoprazole  Injectable 40 milliGRAM(s) IV Push two times a day    MEDICATIONS  (PRN):  acetaminophen     Tablet .. 650 milliGRAM(s) Oral every 6 hours PRN Temp greater or equal to 38C (100.4F), Mild Pain (1 - 3)  aluminum hydroxide/magnesium hydroxide/simethicone Suspension 30 milliLiter(s) Oral every 4 hours PRN Dyspepsia  melatonin 3 milliGRAM(s) Oral at bedtime PRN Insomnia  ondansetron Injectable 4 milliGRAM(s) IV Push every 8 hours PRN Nausea and/or Vomiting      LABS:                        13.0   10.55 )-----------( 289      ( 27 Sep 2023 04:11 )             38.3     09-27    134<L>  |  98  |  8   ----------------------------<  103<H>  3.7   |  23  |  0.60    Ca    9.0      27 Sep 2023 04:11  Phos  2.6     09-27  Mg     1.9     09-27    TPro  7.1  /  Alb  4.4  /  TBili  0.8  /  DBili  x   /  AST  11  /  ALT  9<L>  /  AlkPhos  61  09-27    PT/INR - ( 27 Sep 2023 04:11 )   PT: 11.3 sec;   INR: 1.08 ratio         PTT - ( 27 Sep 2023 04:11 )  PTT:30.3 sec  Urinalysis Basic - ( 27 Sep 2023 04:11 )    Color: x / Appearance: x / SG: x / pH: x  Gluc: 103 mg/dL / Ketone: x  / Bili: x / Urobili: x   Blood: x / Protein: x / Nitrite: x   Leuk Esterase: x / RBC: x / WBC x   Sq Epi: x / Non Sq Epi: x / Bacteria: x

## 2023-09-27 NOTE — DISCHARGE NOTE PROVIDER - NSDCFUADDINST_GEN_ALL_CORE_FT
Please follow up with our Internal Medicine clinic as needed:  [phone] [address], [address]  [place] [address]

## 2023-09-27 NOTE — PROGRESS NOTE ADULT - PROBLEM SELECTOR PLAN 1
4 days of NBNB vomiting iso IBS, hx of multiple GI surgeries.   CT a/p remarkable for duodenitis   RUQ US without cholelithiasis, no duct dilation, gallbladder wnl   Labs remarkable for mild leukocytosis. VSS   - f/u Bcx, Ucx   - f/u H pylori labs   - f/u GI PCR   - started on PPI 40 IV BID   - started on zofran 4 mg q8h PRN   - emailed GI - f/u recs   - holding off on abx   - CTM daily CBC  - c/w [facility] med  - obtain collateral from Dr. More 4 days of NBNB vomiting iso IBS, hx of multiple GI surgeries.   CT a/p remarkable for duodenitis   RUQ US without cholelithiasis, no duct dilation, gallbladder wnl   Labs remarkable for mild leukocytosis. VSS   s/p endoscopy 9/27 - significant for gastritis, no ulcer noted   - f/u Bcx, Ucx   - f/u H pylori labs   - f/u GI PCR   - s/p PPI 40 IV BID   - PPI 40 qd PO   - started on zofran 4 mg q8h PRN   - emailed GI - f/u recs   - holding off on abx   - CTM daily CBC  - c/w lomotil home med  - obtain collateral from Dr. More

## 2023-09-27 NOTE — DISCHARGE NOTE NURSING/CASE MANAGEMENT/SOCIAL WORK - PATIENT PORTAL LINK FT
You can access the FollowMyHealth Patient Portal offered by [facility] by registering at the following website: [email or web address]. By joining [facility]’s FollowMyHealth portal, you will also be able to view your health information using other applications (apps) compatible with our system.

## 2023-09-27 NOTE — PRE PROCEDURE NOTE - PRE PROCEDURE EVALUATION
Attending Physician: Pacheco                            Procedure: EGD     Indication for Procedure:  ________________________________________________________  PAST MEDICAL & SURGICAL HISTORY:  History of open heart surgery      IBS (irritable bowel syndrome)      Pituitary adenoma      S/P partial resection of colon      Anal atresia      Mullerian agenesis        ALLERGIES:  Bactrim (Rash)  trazodone (Rash)  Geodon (Rash)  Viberzi (Other)    HOME MEDICATIONS:  buPROPion 300 mg/24 hours (XL) oral tablet, extended release: 1 tab(s) orally once a day (in the morning) once daily  cabergoline 0.5 mg oral tablet: 0.5 tab(s) orally 2 times a week  Lexapro 20 mg oral tablet: 1 tab(s) orally once a day (at bedtime)  Lomotil 2.5 mg-0.025 mg oral tablet: 1 tab(s) orally once a day    AICD/PPM: [ ] yes   [ ] no    PERTINENT LAB DATA:                        13.0   10.55 )-----------( 289      ( 27 Sep 2023 04:11 )             38.3     09-27    134<L>  |  98  |  8   ----------------------------<  103<H>  3.7   |  23  |  0.60    Ca    9.0      27 Sep 2023 04:11  Phos  2.6     09-27  Mg     1.9     09-27    TPro  7.1  /  Alb  4.4  /  TBili  0.8  /  DBili  x   /  AST  11  /  ALT  9<L>  /  AlkPhos  61  09-27    PT/INR - ( 27 Sep 2023 04:11 )   PT: 11.3 sec;   INR: 1.08 ratio         PTT - ( 27 Sep 2023 04:11 )  PTT:30.3 sec        HCG Quantitative, Serum: <2.0 mIU/mL (09-25-23 @ 21:31)      PHYSICAL EXAMINATION:    Height (cm): 162.6  Weight (kg): 58.1  BMI (kg/m2): 22  BSA (m2): 1.62T(C): 36.4  HR: 60  BP: 111/53  RR: 18  SpO2: 98%    Constitutional: NAD    Neck:  No JVD  Respiratory: CTAB/L  Cardiovascular: S1 and S2  Gastrointestinal: BS+, soft, NT/ND  Extremities: No peripheral edema  Neurological: A/O x 3, no focal deficits        COMMENTS:    The patient is a suitable candidate for the planned procedure unless box checked [ ]  No, explain:     Attending Physician: Dr Bergman                            Procedure: EGD     Indication for Procedure: abdominal  pain   ________________________________________________________  PAST MEDICAL & SURGICAL HISTORY:  History of open heart surgery      IBS (irritable bowel syndrome)      Pituitary adenoma      S/P partial resection of colon      Anal atresia      Mullerian agenesis        ALLERGIES:  Bactrim (Rash)  trazodone (Rash)  Geodon (Rash)  Viberzi (Other)    HOME MEDICATIONS:  buPROPion 300 mg/24 hours (XL) oral tablet, extended release: 1 tab(s) orally once a day (in the morning) once daily  cabergoline 0.5 mg oral tablet: 0.5 tab(s) orally 2 times a week  Lexapro 20 mg oral tablet: 1 tab(s) orally once a day (at bedtime)  Lomotil 2.5 mg-0.025 mg oral tablet: 1 tab(s) orally once a day    AICD/PPM: [ ] yes   [x ] no    PERTINENT LAB DATA:                        13.0   10.55 )-----------( 289      ( 27 Sep 2023 04:11 )             38.3     09-27    134<L>  |  98  |  8   ----------------------------<  103<H>  3.7   |  23  |  0.60    Ca    9.0      27 Sep 2023 04:11  Phos  2.6     09-27  Mg     1.9     09-27    TPro  7.1  /  Alb  4.4  /  TBili  0.8  /  DBili  x   /  AST  11  /  ALT  9<L>  /  AlkPhos  61  09-27    PT/INR - ( 27 Sep 2023 04:11 )   PT: 11.3 sec;   INR: 1.08 ratio         PTT - ( 27 Sep 2023 04:11 )  PTT:30.3 sec        HCG Quantitative, Serum: <2.0 mIU/mL (09-25-23 @ 21:31)      PHYSICAL EXAMINATION:    Height (cm): 162.6  Weight (kg): 58.1  BMI (kg/m2): 22  BSA (m2): 1.62T(C): 36.4  HR: 60  BP: 111/53  RR: 18  SpO2: 98%    Constitutional: NAD    Neck:  No JVD  Respiratory: no resp distress   Cardiovascular: rrr  Extremities: No peripheral edema  Neurological: A/O x 3, no focal deficits        COMMENTS:    The patient is a suitable candidate for the planned procedure unless box checked [ ]  No, explain:

## 2023-09-28 ENCOUNTER — NON-APPOINTMENT (OUTPATIENT)
Age: 28
End: 2023-09-28

## 2023-09-28 ENCOUNTER — APPOINTMENT (OUTPATIENT)
Age: 28
End: 2023-09-28

## 2023-10-01 LAB
CULTURE RESULTS: SIGNIFICANT CHANGE UP
SPECIMEN SOURCE: SIGNIFICANT CHANGE UP

## 2023-10-02 LAB
SURGICAL PATHOLOGY STUDY: SIGNIFICANT CHANGE UP

## 2023-10-04 ENCOUNTER — NON-APPOINTMENT (OUTPATIENT)
Age: 28
End: 2023-10-04

## 2023-10-06 ENCOUNTER — OUTPATIENT (OUTPATIENT)
Dept: OUTPATIENT SERVICES | Facility: HOSPITAL | Age: 28
LOS: 1 days | End: 2023-10-06
Payer: COMMERCIAL

## 2023-10-06 ENCOUNTER — APPOINTMENT (OUTPATIENT)
Dept: ULTRASOUND IMAGING | Facility: CLINIC | Age: 28
End: 2023-10-06
Payer: COMMERCIAL

## 2023-10-06 DIAGNOSIS — Q52.0 CONGENITAL ABSENCE OF VAGINA: ICD-10-CM

## 2023-10-06 DIAGNOSIS — Q43.8 OTHER SPECIFIED CONGENITAL MALFORMATIONS OF INTESTINE: ICD-10-CM

## 2023-10-06 DIAGNOSIS — Q89.9 CONGENITAL MALFORMATION, UNSPECIFIED: Chronic | ICD-10-CM

## 2023-10-06 DIAGNOSIS — Q21.1 ATRIAL SEPTAL DEFECT: Chronic | ICD-10-CM

## 2023-10-06 DIAGNOSIS — Z93.8 OTHER ARTIFICIAL OPENING STATUS: ICD-10-CM

## 2023-10-06 DIAGNOSIS — Q52.0 CONGENITAL ABSENCE OF VAGINA: Chronic | ICD-10-CM

## 2023-10-06 DIAGNOSIS — Q42.3 CONGENITAL ABSENCE, ATRESIA AND STENOSIS OF ANUS WITHOUT FISTULA: Chronic | ICD-10-CM

## 2023-10-06 DIAGNOSIS — N94.10 UNSPECIFIED DYSPAREUNIA: ICD-10-CM

## 2023-10-06 DIAGNOSIS — Q43.7 PERSISTENT CLOACA: ICD-10-CM

## 2023-10-06 DIAGNOSIS — Z98.890 OTHER SPECIFIED POSTPROCEDURAL STATES: Chronic | ICD-10-CM

## 2023-10-06 PROCEDURE — 76856 US EXAM PELVIC COMPLETE: CPT | Mod: 26

## 2023-10-06 PROCEDURE — 76856 US EXAM PELVIC COMPLETE: CPT

## 2023-10-16 ENCOUNTER — APPOINTMENT (OUTPATIENT)
Dept: PEDIATRIC SURGERY | Facility: CLINIC | Age: 28
End: 2023-10-16
Payer: COMMERCIAL

## 2023-10-16 PROCEDURE — 99214 OFFICE O/P EST MOD 30 MIN: CPT | Mod: 95

## 2023-10-24 ENCOUNTER — TRANSCRIPTION ENCOUNTER (OUTPATIENT)
Age: 28
End: 2023-10-24

## 2023-10-27 RX ORDER — CABERGOLINE 0.5 MG/1
0.5 TABLET ORAL
Refills: 0 | DISCHARGE

## 2023-10-27 RX ORDER — BUPROPION HYDROCHLORIDE 150 MG/1
1 TABLET, EXTENDED RELEASE ORAL
Refills: 0 | DISCHARGE

## 2023-10-27 RX ORDER — DIPHENOXYLATE HCL/ATROPINE 2.5-.025MG
1 TABLET ORAL
Refills: 0 | DISCHARGE

## 2023-10-27 RX ORDER — ESCITALOPRAM OXALATE 10 MG/1
1 TABLET, FILM COATED ORAL
Refills: 0 | DISCHARGE

## 2023-10-31 PROBLEM — Z90.49 ACQUIRED ABSENCE OF OTHER SPECIFIED PARTS OF DIGESTIVE TRACT: Chronic | Status: ACTIVE | Noted: 2023-09-26

## 2023-11-02 ENCOUNTER — APPOINTMENT (OUTPATIENT)
Dept: RADIOLOGY | Facility: CLINIC | Age: 28
End: 2023-11-02
Payer: COMMERCIAL

## 2023-11-02 ENCOUNTER — APPOINTMENT (OUTPATIENT)
Dept: MRI IMAGING | Facility: CLINIC | Age: 28
End: 2023-11-02
Payer: COMMERCIAL

## 2023-11-02 ENCOUNTER — OUTPATIENT (OUTPATIENT)
Dept: OUTPATIENT SERVICES | Facility: HOSPITAL | Age: 28
LOS: 1 days | End: 2023-11-02
Payer: COMMERCIAL

## 2023-11-02 DIAGNOSIS — Q89.9 CONGENITAL MALFORMATION, UNSPECIFIED: Chronic | ICD-10-CM

## 2023-11-02 DIAGNOSIS — Q52.0 CONGENITAL ABSENCE OF VAGINA: Chronic | ICD-10-CM

## 2023-11-02 DIAGNOSIS — Q42.3 CONGENITAL ABSENCE, ATRESIA AND STENOSIS OF ANUS WITHOUT FISTULA: Chronic | ICD-10-CM

## 2023-11-02 DIAGNOSIS — D35.2 BENIGN NEOPLASM OF PITUITARY GLAND: ICD-10-CM

## 2023-11-02 DIAGNOSIS — Q21.1 ATRIAL SEPTAL DEFECT: Chronic | ICD-10-CM

## 2023-11-02 DIAGNOSIS — Z98.890 OTHER SPECIFIED POSTPROCEDURAL STATES: Chronic | ICD-10-CM

## 2023-11-02 PROBLEM — K58.9 IRRITABLE BOWEL SYNDROME WITHOUT DIARRHEA: Chronic | Status: ACTIVE | Noted: 2023-09-26

## 2023-11-02 PROCEDURE — 70553 MRI BRAIN STEM W/O & W/DYE: CPT

## 2023-11-02 PROCEDURE — A9585: CPT

## 2023-11-02 PROCEDURE — 70553 MRI BRAIN STEM W/O & W/DYE: CPT | Mod: 26

## 2023-11-13 ENCOUNTER — APPOINTMENT (OUTPATIENT)
Dept: OBGYN | Facility: CLINIC | Age: 28
End: 2023-11-13

## 2023-11-21 ENCOUNTER — APPOINTMENT (OUTPATIENT)
Dept: OBGYN | Facility: HOSPITAL | Age: 28
End: 2023-11-21

## 2024-01-04 NOTE — ASU PATIENT PROFILE, ADULT - NS TRANSFER DENTURES
Quality 226: Preventive Care And Screening: Tobacco Use: Screening And Cessation Intervention: Patient screened for tobacco use and is an ex/non-smoker Quality 130: Documentation Of Current Medications In The Medical Record: Current Medications Documented Detail Level: Detailed Quality 110: Preventive Care And Screening: Influenza Immunization: Influenza Immunization Administered during Influenza season Quality 431: Preventive Care And Screening: Unhealthy Alcohol Use - Screening: Patient not identified as an unhealthy alcohol user when screened for unhealthy alcohol use using a systematic screening method no

## 2024-01-25 ENCOUNTER — APPOINTMENT (OUTPATIENT)
Dept: ENDOCRINOLOGY | Facility: CLINIC | Age: 29
End: 2024-01-25
Payer: COMMERCIAL

## 2024-01-25 VITALS
OXYGEN SATURATION: 97 % | HEIGHT: 64.57 IN | BODY MASS INDEX: 21.59 KG/M2 | SYSTOLIC BLOOD PRESSURE: 104 MMHG | DIASTOLIC BLOOD PRESSURE: 62 MMHG | HEART RATE: 67 BPM | WEIGHT: 128 LBS

## 2024-01-25 PROCEDURE — G2211 COMPLEX E/M VISIT ADD ON: CPT

## 2024-01-25 PROCEDURE — 99214 OFFICE O/P EST MOD 30 MIN: CPT

## 2024-01-25 NOTE — HISTORY OF PRESENT ILLNESS
[FreeTextEntry1] : Patient is a 29 F with history of Mullerian agenesis, microprolactinemia, subclinical hypothyroidism here for follow up. Last seen me in 09/2022.   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 H/o Bipolar disease and suicide attempt and had rehab done PSH: Coarctation of the aorta - VSD-ASD-PDA. Cloaca requiring colostomy and then reversal of procedure. In the process of looking for new GI doctor.  Soc Hx: No tobacco. Occ alcohol. once/week. Patient is adopted. Currently working as a  at a dentist office.  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 No history of getting any periods in her life. No history of getting hormone replacement therapy. She admits to having normal puberty, height attainment and breast development.  08/26/19: Prolactin 37.3, ACTH 69.4, AM Cortisol 16, HGH 0.07, Androstenedione 252, 17-OHP normal, TFTS normal Pelvic U/S 09/11/19: Congenitally absent uterus but has normal Left and Right ovary. She saw  and had normal chromosomal analysis testing, was normal.   # Microprolactinoma  - Previously had blood work which showed 09/13/19: ACTH 70.3, AM Cortisol 19.5, Prolactin 40.6, Monomeric Prolactin 31, 34% macroprolactin, Androstenedione 343 - 11/25/19: MRI Brain showed pituitary gland approx 0.8 cm in height which is normal. Normal posterior pituitary bright spot. Pituitary stalk is centrally located and appears normal. Optic chiasm was normal. Vague area of decreased enhancement was seen involving the right pituitary gland. It measures 0.8 x 0.7 cm in size and could be a vague pituitary microadenoma.Spoke with radiologist who mentioned normal pituitary gland. 07/29/21: Pituitary Gland adenoma minimally changed in size. 8 mm in size  - Started on Cabergoline 0.25 mg twice weekly since 02/2022 07/08/2022: pituitary adenoma 3 mm (decrease in size from prior) 11/2/2023: pitutiary adenoma right mildly increased in size now up to 11 mm size without optic chiasm contact, slight stalk deviation in the left   # Subclinical hypothyroidism  TSH elevated at 5.6 with normal FT4 No obvious symptoms of hypothyroidism   # HDL  - LDL elevated along with non HDL  - Not on medications   9/30/2022 visit: Occ headaches and occasional blurry vision. She has no h/o bone fractures. No headaches. No abdominal striae, no weight gain, no HTN or T2DM. No proximal myopathy. No breast pain or galactorrhea. Does not get periods due to congenitally absent uterus. On cabergoline 0.25 mg twice per week (on Cabergoline since 02/2022). 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 Occasional fatigue.   3/30/2023 visit events: getting a divorce. Getting GI surgery soon. Denies galactorrhea. Denies significant fatigue. Has been feeling whooshing session in her ear and eyes cannot focus.   Patient's most recent MRI 11/2/2023 shows: Right pituitary adenoma mildly increased in size comparing back to 11/2019, now up to 11 mm size compared to 8 mm. Without optic chiasm contact. Slight stalk deviation in the left.  01/25/2024 visit events: remains on cabergoline 0.25 mg twice weekly. Denies breast discharge. She is wondering about her potential for surrogacy and fertility.

## 2024-01-25 NOTE — ASSESSMENT
[FreeTextEntry1] : Patient is a 29 F with history of Mullerian agenesis, microprolactinemia, subclinical hypothyroidism here for follow up.  # Microprolactinoma - Previously had blood work which showed 09/13/19: ACTH 70.3, AM Cortisol 19.5, Prolactin 40.6, Monomeric Prolactin 31, 34% macroprolactin, Androstenedione 343 - 11/25/19: MRI Brain showed pituitary gland approx 0.8 x 0.7 cm in size 07/29/21: Pituitary Gland adenoma minimally changed in size. 8 mm in size 11/2/2023: pitutiary adenoma right mildly increased in size now up to 11 mm size without optic chiasm contact, slight stalk deviation in the left - Started on Cabergoline 0.25 mg twice weekly since 02/2022 - Most recent imaging 07/08/2022: pituitary adenoma 3 mm (decrease in size from prior) - Check prolactin level today - Discussed with the patient we will likely keep her on cabergoline for 1-2 years and if brain MRI shows stable microadenoma, we will stop cabergoline  # Subclinical hypothyroidism - TSH elevated at 5.6 with normal FT4 - No obvious symptoms of hypothyroidism - Repeat TFT today  # HDL - LDL elevated along with non HDL - Not on medications  # 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 - Patient is wondering and would like to proceed with surrogacy with her eggs if possible, so checking anti mullerian hormone    RTC in 3 months  Marisela Hernandez MD Endocrinology, Diabetes and Metabolism [address] [place] [address]. [address] [place] [address] Tel [phone] Fax [phone]

## 2024-02-01 LAB
ALBUMIN SERPL ELPH-MCNC: 5 G/DL
ALP BLD-CCNC: 77 U/L
ALT SERPL-CCNC: 12 U/L
ANION GAP SERPL CALC-SCNC: 13 MMOL/L
ANTI-MUELLERIAN HORMONE: 6.71 NG/ML
AST SERPL-CCNC: 17 U/L
BILIRUB SERPL-MCNC: 0.2 MG/DL
BUN SERPL-MCNC: 12 MG/DL
CALCIUM SERPL-MCNC: 10.5 MG/DL
CHLORIDE SERPL-SCNC: 100 MMOL/L
CO2 SERPL-SCNC: 26 MMOL/L
CREAT SERPL-MCNC: 0.78 MG/DL
EGFR: 105 ML/MIN/1.73M2
ESTRADIOL SERPL-MCNC: 257 PG/ML
FSH SERPL-MCNC: 2.7 IU/L
GLUCOSE SERPL-MCNC: 86 MG/DL
IGF-1 INTERP: NORMAL
IGF-I BLD-MCNC: 163 NG/ML
LH SERPL-ACNC: 4.1 IU/L
POTASSIUM SERPL-SCNC: 4.4 MMOL/L
PROLACTIN SERPL-MCNC: 0.6 NG/ML
PROT SERPL-MCNC: 8 G/DL
SODIUM SERPL-SCNC: 139 MMOL/L
T4 FREE SERPL-MCNC: 1 NG/DL
TSH SERPL-ACNC: 2.75 UIU/ML

## 2024-02-05 LAB
MONOMERIC PROLACTIN (ICMA)*: 0.66 NG/ML
PERCENT MACROPROLACTIN: 0 %
PROLACTIN, SERUM (ICMA)*: 0.66 NG/ML

## 2024-02-19 LAB — BACTERIA THROAT CULT: NORMAL

## 2024-03-04 ENCOUNTER — RX RENEWAL (OUTPATIENT)
Age: 29
End: 2024-03-04

## 2024-03-04 RX ORDER — CABERGOLINE 0.5 MG/1
0.5 TABLET ORAL
Qty: 4 | Refills: 3 | Status: ACTIVE | COMMUNITY
Start: 2022-02-17 | End: 1900-01-01

## 2024-03-06 RX ORDER — DIPHENOXYLATE HYDROCHLORIDE AND ATROPINE SULFATE 2.5; .025 MG/1; MG/1
2.5-0.025 TABLET ORAL
Refills: 0 | Status: DISCONTINUED | COMMUNITY
End: 2024-03-06

## 2024-03-06 RX ORDER — DIPHENOXYLATE HYDROCHLORIDE AND ATROPINE SULFATE 2.5; .025 MG/1; MG/1
2.5-0.025 TABLET ORAL
Qty: 60 | Refills: 0 | Status: COMPLETED | COMMUNITY
Start: 2023-12-29 | End: 2024-04-05

## 2024-03-06 RX ORDER — DIPHENOXYLATE HYDROCHLORIDE AND ATROPINE SULFATE 2.5; .025 MG/1; MG/1
2.5-0.025 TABLET ORAL
Qty: 60 | Refills: 3 | Status: DISCONTINUED | COMMUNITY
Start: 2023-09-07 | End: 2024-03-06

## 2024-03-06 NOTE — ED ADULT TRIAGE NOTE - NS ED TRIAGE AVPU SCALE
Jordan Bean will request vaccine records from [place].   Alert-The patient is alert, awake and responds to voice. The patient is oriented to time, place, and person. The triage nurse is able to obtain subjective information.

## 2024-03-26 DIAGNOSIS — E03.8 OTHER SPECIFIED HYPOTHYROIDISM: ICD-10-CM

## 2024-03-26 DIAGNOSIS — D35.2 BENIGN NEOPLASM OF PITUITARY GLAND: ICD-10-CM

## 2024-03-26 DIAGNOSIS — E22.1 HYPERPROLACTINEMIA: ICD-10-CM

## 2024-03-28 ENCOUNTER — APPOINTMENT (OUTPATIENT)
Dept: MRI IMAGING | Facility: CLINIC | Age: 29
End: 2024-03-28

## 2024-03-28 ENCOUNTER — APPOINTMENT (OUTPATIENT)
Dept: MRI IMAGING | Facility: CLINIC | Age: 29
End: 2024-03-28
Payer: COMMERCIAL

## 2024-03-28 PROCEDURE — 70553 MRI BRAIN STEM W/O & W/DYE: CPT

## 2024-04-05 LAB
ALBUMIN SERPL ELPH-MCNC: 4.6 G/DL
ALP BLD-CCNC: 62 U/L
ALT SERPL-CCNC: 14 U/L
ANION GAP SERPL CALC-SCNC: 11 MMOL/L
AST SERPL-CCNC: 13 U/L
BILIRUB SERPL-MCNC: 0.5 MG/DL
BUN SERPL-MCNC: 12 MG/DL
CALCIUM SERPL-MCNC: 10 MG/DL
CHLORIDE SERPL-SCNC: 105 MMOL/L
CO2 SERPL-SCNC: 24 MMOL/L
CREAT SERPL-MCNC: 0.92 MG/DL
EGFR: 86 ML/MIN/1.73M2
GLUCOSE SERPL-MCNC: 90 MG/DL
MONOMERIC PROLACTIN (ICMA)*: 1.54 NG/ML
PERCENT MACROPROLACTIN: 4 %
POTASSIUM SERPL-SCNC: 4.7 MMOL/L
PROLACTIN SERPL-MCNC: 1.6 NG/ML
PROLACTIN, SERUM (ICMA)*: 1.61 NG/ML
PROT SERPL-MCNC: 7.1 G/DL
SODIUM SERPL-SCNC: 140 MMOL/L
T4 FREE SERPL-MCNC: 1.1 NG/DL
THYROPEROXIDASE AB SERPL IA-ACNC: <10 IU/ML
TSH SERPL-ACNC: 4.75 UIU/ML

## 2024-04-24 RX ORDER — DIPHENOXYLATE HYDROCHLORIDE AND ATROPINE SULFATE 2.5; .025 MG/1; MG/1
2.5-0.025 TABLET ORAL
Qty: 60 | Refills: 5 | Status: DISCONTINUED | COMMUNITY
Start: 2024-04-23 | End: 2024-04-24

## 2024-04-24 RX ORDER — DIPHENOXYLATE HYDROCHLORIDE AND ATROPINE SULFATE 2.5; .025 MG/1; MG/1
2.5-0.025 TABLET ORAL
Qty: 60 | Refills: 0 | Status: ACTIVE | COMMUNITY
Start: 2024-04-24 | End: 1900-01-01

## 2024-04-29 ENCOUNTER — TRANSCRIPTION ENCOUNTER (OUTPATIENT)
Age: 29
End: 2024-04-29

## 2024-05-05 ENCOUNTER — APPOINTMENT (OUTPATIENT)
Dept: MRI IMAGING | Facility: CLINIC | Age: 29
End: 2024-05-05
Payer: COMMERCIAL

## 2024-05-05 ENCOUNTER — OUTPATIENT (OUTPATIENT)
Dept: OUTPATIENT SERVICES | Facility: HOSPITAL | Age: 29
LOS: 1 days | End: 2024-05-05
Payer: COMMERCIAL

## 2024-05-05 DIAGNOSIS — Q52.0 CONGENITAL ABSENCE OF VAGINA: Chronic | ICD-10-CM

## 2024-05-05 DIAGNOSIS — Q21.1 ATRIAL SEPTAL DEFECT: Chronic | ICD-10-CM

## 2024-05-05 DIAGNOSIS — Q42.3 CONGENITAL ABSENCE, ATRESIA AND STENOSIS OF ANUS WITHOUT FISTULA: Chronic | ICD-10-CM

## 2024-05-05 DIAGNOSIS — Z00.8 ENCOUNTER FOR OTHER GENERAL EXAMINATION: ICD-10-CM

## 2024-05-05 DIAGNOSIS — Z98.890 OTHER SPECIFIED POSTPROCEDURAL STATES: Chronic | ICD-10-CM

## 2024-05-05 DIAGNOSIS — Q89.9 CONGENITAL MALFORMATION, UNSPECIFIED: Chronic | ICD-10-CM

## 2024-05-05 PROCEDURE — A9585: CPT

## 2024-05-05 PROCEDURE — 70546 MR ANGIOGRAPH HEAD W/O&W/DYE: CPT

## 2024-05-05 PROCEDURE — 70546 MR ANGIOGRAPH HEAD W/O&W/DYE: CPT | Mod: 26

## 2024-05-08 ENCOUNTER — APPOINTMENT (OUTPATIENT)
Dept: OBGYN | Facility: CLINIC | Age: 29
End: 2024-05-08

## 2024-05-22 ENCOUNTER — NON-APPOINTMENT (OUTPATIENT)
Age: 29
End: 2024-05-22

## 2024-05-23 ENCOUNTER — APPOINTMENT (OUTPATIENT)
Dept: OBGYN | Facility: CLINIC | Age: 29
End: 2024-05-23
Payer: COMMERCIAL

## 2024-05-23 VITALS
BODY MASS INDEX: 22.65 KG/M2 | HEIGHT: 64.57 IN | WEIGHT: 134.31 LBS | DIASTOLIC BLOOD PRESSURE: 50 MMHG | SYSTOLIC BLOOD PRESSURE: 106 MMHG | HEART RATE: 64 BPM

## 2024-05-23 DIAGNOSIS — M79.605 PAIN IN RIGHT LEG: ICD-10-CM

## 2024-05-23 DIAGNOSIS — Z87.730 PERSONAL HISTORY OF (CORRECTED) CLEFT LIP AND PALATE: ICD-10-CM

## 2024-05-23 DIAGNOSIS — Z87.42 PERSONAL HISTORY OF OTHER DISEASES OF THE FEMALE GENITAL TRACT: ICD-10-CM

## 2024-05-23 DIAGNOSIS — U07.1 COVID-19: ICD-10-CM

## 2024-05-23 DIAGNOSIS — M62.89 OTHER SPECIFIED DISORDERS OF MUSCLE: ICD-10-CM

## 2024-05-23 DIAGNOSIS — N94.10 UNSPECIFIED DYSPAREUNIA: ICD-10-CM

## 2024-05-23 DIAGNOSIS — Q43.8 OTHER SPECIFIED CONGENITAL MALFORMATIONS OF INTESTINE: ICD-10-CM

## 2024-05-23 DIAGNOSIS — Z86.69 PERSONAL HISTORY OF OTHER DISEASES OF THE NERVOUS SYSTEM AND SENSE ORGANS: ICD-10-CM

## 2024-05-23 DIAGNOSIS — Q43.7 PERSISTENT CLOACA: ICD-10-CM

## 2024-05-23 DIAGNOSIS — R87.811 VAGINAL HIGH RISK HUMAN PAPILLOMAVIRUS (HPV) DNA TEST POSITIVE: ICD-10-CM

## 2024-05-23 DIAGNOSIS — Z93.8 OTHER ARTIFICIAL OPENING STATUS: ICD-10-CM

## 2024-05-23 DIAGNOSIS — A63.0 ANOGENITAL (VENEREAL) WARTS: ICD-10-CM

## 2024-05-23 DIAGNOSIS — E78.49 OTHER HYPERLIPIDEMIA: ICD-10-CM

## 2024-05-23 DIAGNOSIS — F81.9 DEVELOPMENTAL DISORDER OF SCHOLASTIC SKILLS, UNSPECIFIED: ICD-10-CM

## 2024-05-23 DIAGNOSIS — Z87.74 PERSONAL HISTORY OF (CORRECTED) CONGENITAL MALFORMATIONS OF HEART AND CIRCULATORY SYSTEM: ICD-10-CM

## 2024-05-23 DIAGNOSIS — Q52.0 CONGENITAL ABSENCE OF VAGINA: ICD-10-CM

## 2024-05-23 DIAGNOSIS — Q75.2 HYPERTELORISM: ICD-10-CM

## 2024-05-23 DIAGNOSIS — R21 RASH AND OTHER NONSPECIFIC SKIN ERUPTION: ICD-10-CM

## 2024-05-23 DIAGNOSIS — M79.604 PAIN IN RIGHT LEG: ICD-10-CM

## 2024-05-23 DIAGNOSIS — Z11.3 ENCOUNTER FOR SCREENING FOR INFECTIONS WITH A PREDOMINANTLY SEXUAL MODE OF TRANSMISSION: ICD-10-CM

## 2024-05-23 PROCEDURE — G2212 PROLONG OUTPT/OFFICE VIS: CPT

## 2024-05-23 PROCEDURE — G2211 COMPLEX E/M VISIT ADD ON: CPT

## 2024-05-23 PROCEDURE — 99215 OFFICE O/P EST HI 40 MIN: CPT

## 2024-05-23 PROCEDURE — 99417 PROLNG OP E/M EACH 15 MIN: CPT

## 2024-05-23 PROCEDURE — 99459 PELVIC EXAMINATION: CPT

## 2024-05-23 NOTE — ASSESSMENT
[FreeTextEntry1] :  . Sandi is a 28yo female with h/o congenital cloacal anomaly & imperforate anus, uterovaginal agenesis, currently with rectal jack-vagina (h/o prolapse revision, introitoplasty) and Braswell appendicostomy.   She is sexually active with male partner (boyfriend) and this is going well. She does have some pain with/ after sex, but we discussed this may be due to the frequency of penetrative intercourse.   She has a h/o neovaginal mucosa prolapse; this has been stable recently, with no bleeding or significant pain. She and I both agree that no procedure is indicated for this issue at this point.  Most recently, she is establishing care in [place] where there is a multidisciplinary pediatric & adult program for patients with colorectal & complex pelvic conditions. 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 Detailed counseling and reassurance was provided. I do recommend these lesions be excised at time of surgery, in addition to the already planned internal exam & excision of any suspicious lesions. She will then need regular surveillance for recurrence. I encouraged Sandi and her partner to confirm whether they have received HPV vaccine and if not, they should both receive this vaccine series.   Sandi's previous medical records were returned to her today so that she can share these with her [place] team.  I will continue to be available to see Sandi as needed in the future.   All questions were answered, and understanding was expressed. Patient was encouraged to contact us with additional questions or concerns.   No Norris MD Director, Pediatric & Adolescent Gynecology [facility] Office: [phone]

## 2024-05-23 NOTE — HISTORY OF PRESENT ILLNESS
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 Gold at [facility], h/o absent uterus & vagina. Distal rectum was used to create a jack-vagina, and proximal rectum was brought to perineum as a jack-rectum.   2/26/2013: ([facility])  EUA/vagino/dilation (Breech/Asif)  - vagina 25 hegar, TVL 7.5 cm w/ pressure  10/7/2016: ([facility]) excision of prolapsed neovaginal tissue, introitoplasty (Lisa)  - introitus only 1 fingerbreadth - 1.5 cm prolapsed tissue also had daniel Chait tube change (Karrie)      Gyn anatomy: Rectal jack-vagina s/p prolapse repair x 2 / introitoplasty, last in 2017 Bowel management: has a Daniel, has not used a catheter since July 2020, currently on Lomotil  Urinary: mostly continent, has some stress incontinence; sees Urogyn at [facility], sent to PFPT but it was challenging   1st visit with me was in Feb 2023 to establish care, discuss dyspareunia and have assessment of jack-vagina.  Summary:  - Sandi is recently /  . Currently sexually active with male partner(s). Has pain with vaginal sex at baseline, but it has become easier & more enjoyable over time; rectal penetration helps her relax (though there is a 'ring' of more firm tissue to pass through). Has adequate neovaginal lubrication. Now having consistent sex for few months with 25yo partner.  - No abnormal discharge from neovagina. Has some mucous discharge from neovagina and neorectum, does not feel excessive. She has not been doing vaginal dilations.   - For colorectal care, following with Dr. Harris. Planning for revision of Daniel (has had leakage & pain, problems with enemas). - On exam: vagina accommodates single digit; unable to tolerate insertion of narrow pediatric speculum. Slight pain with initial penetration, reports more pain with deep penetration. No tightness of pelvic floor muscles noted.  - We discussed possible causes & treatments for dyspareunia. STI testing negative.   Follow up visit April 24, 2023: She had a cold a few weeks ago with a lot of coughing  She had to take benzonatate 200 mg 3x/day x 10 days  the coughing was awful -- had accidents -- no longer coughing now   She feels there is increased prolapse in neovagina now  can no longer do foreplay or as much external stimulation b/c it's painful   has had sex twice since she was sick - same partner (Jaron) and she notes much more sensitivity  rectal penetration helps - but as soon as she pauses, it is hard to get back into it  being on top during sex does feel better in general  however her partner does a really good job of controlling depth of penetration  when she is on top, though, it is harder to control - she can barely talk - it's very overwhelming   I asked about PFPT - she did do this in the past  but that particular therapist does not do internal work Sandi also says she has such intense internal pain after sex  describes it as a soreness  also feels pain in rectal area and severe gas after  it is frustrating and it is not clear to her whether it is a muscular problem or something about the vagina & proximity to rectum she has tried the lubricant we discussed last time (Ah yes!) and found it helpful    Follow up 09/13/2023: Sandi is here to discuss surgery to address her Daniel and jack-vagina due to painful symptoms with both.  She plans to have Daniel closed with Dr. Harris. She cannot deal with the mucosal discharge from the umbilicus anymore, and no longer using enemas. He plans to do the procedure laparoscopically.  Sandi shares that she is finally able to have sex and it is relatively enjoyable.  She continues in a relationship with her partner Jaron.  She is able to have vaginal sex. Recently things are getting better. The prolapsed tissue is less sensitive now. She thinks the length of the vagina is one of the issues. He is very much in control of depth, etc. to avoid 'going in too far'. She tends to be uncomfortable after sex. Feels her vagina itself is painful.  She was having so much pain she couldn't catch her breath before - felt a squeezing sensation - almost like bladder spasms but in vaginal area - and she couldn't relax any muscles.  Now this has improved.  Angles are a big issue. But now she can be on top - that took a long time.  When asked about pain during sex rather than after - she does have some discomfort. Not sure why. Maybe because it's new? But she is now having sex almost every day (her boyfriend is 25yo).   The prolapse is stable since our last visit. She is not sure if we should remove it or if she will eventually 'run out' of tissue.  She is amenable to having even an open surgery if it will help delineate her anatomy. But she is fine to start with laparoscopic surgery for the Daniel and to see if we can release some adhesions.   05/08/24: Arrived more than 20 minutes late to appointment and was advised to reschedule  Today 05/23/24: here today with her boyfriend Jaron Martines:  Sandi shares that she is transferring her care to [facility].  She is here today for follow-up, as she would like to still have established relationship with an MD here in [place]  Just came from urgent care - negative for covid/strep/influenza - has bad congestion, and most bothersome symptom is coughing, as this worsens her mucosal prolapse  We discussed her previously scheduled surgery in fall 2023:  Sandi notes that she had pain & vomiting in Sept 2023 and was advised to see GI  There was then the question of whether the Daniel should be closed  So ultimately decided to cancel  Sandi shares that she has since seen the team in [place]  ([facility] within the Division of Pediatric Colorectal & Pelvic Reconstruction)  Dr. Lori Daigle (Adult colorectal surgery & Peds surgery) Dr. Nazanin Granado (Pediatric & Adolescent Gyn & adult Gyn)   Sandi also met a Urogynecologist there  and will meet with a GI- motility specialist  French: surgery is on hold as they were getting to know her story in [place] will have a virtual appt before she goes in person again  they told her it may be better to maintain the Daniel in case she needs enemas again in the future Sandi notes she has not used enemas in a very long time  She did have a good period of time (6+ months) when she did not have leakage from the Daniel  but then, a week after her [place] consult, she did have a lot of mucous leakage  Her primary concern at this time is recent dx of hrHPV -  During Urogyn exam, she was told that she may have HPV - she is not sure what they saw that indicated this She then had a vaginal swab that showed hrHPV (swab was internal, and very painful)  Planned for EUA, biopsies in [place] Sandi notes that this Dx surprised her, as she always had negative STI testing  Sandi does not have pain or bleeding with sex at this point  They are having sex daily or every other day  she does have adequate lubrication  Currently: she does have mucosal prolapse, likely d/t coughing  the only bleeding she has is from her anus, when she has large BMs - can be intermittently constipated   Sandi shares that she & her boyfriend have brought in another person (female) whom they have sex with  Sandi has informed this person that she has hrHPV and wants to make sure there is nothing else she needs to be doing She asked about HPV vaccine; she & bf do not believe they have had this vaccine

## 2024-07-03 ENCOUNTER — RX RENEWAL (OUTPATIENT)
Age: 29
End: 2024-07-03

## 2024-07-25 ENCOUNTER — APPOINTMENT (OUTPATIENT)
Dept: ENDOCRINOLOGY | Facility: CLINIC | Age: 29
End: 2024-07-25
Payer: COMMERCIAL

## 2024-07-25 VITALS
DIASTOLIC BLOOD PRESSURE: 68 MMHG | WEIGHT: 129 LBS | SYSTOLIC BLOOD PRESSURE: 114 MMHG | HEART RATE: 73 BPM | OXYGEN SATURATION: 98 % | BODY MASS INDEX: 21.75 KG/M2

## 2024-07-25 DIAGNOSIS — D35.2 BENIGN NEOPLASM OF PITUITARY GLAND: ICD-10-CM

## 2024-07-25 DIAGNOSIS — Q52.0 CONGENITAL ABSENCE OF VAGINA: ICD-10-CM

## 2024-07-25 DIAGNOSIS — E22.1 HYPERPROLACTINEMIA: ICD-10-CM

## 2024-07-25 DIAGNOSIS — E03.8 OTHER SPECIFIED HYPOTHYROIDISM: ICD-10-CM

## 2024-07-25 PROCEDURE — 99214 OFFICE O/P EST MOD 30 MIN: CPT

## 2024-07-25 NOTE — ASSESSMENT
[FreeTextEntry1] : Patient is a 29 F with history of Mullerian agenesis, microprolactinemia, subclinical hypothyroidism here for follow up.  # Microprolactinoma - Previously had blood work which showed 09/13/19: ACTH 70.3, AM Cortisol 19.5, Prolactin 40.6, Monomeric Prolactin 31, 34% macroprolactin, Androstenedione 343 - 11/25/19: MRI Brain showed pituitary gland approx 0.8 x 0.7 cm in size 07/29/21: Pituitary Gland adenoma minimally changed in size. 8 mm in size 11/2/2023: pitutiary adenoma right mildly increased in size now up to 11 mm size without optic chiasm contact, slight stalk deviation in the left 3/8/2024: Grossly stable mild enlargement of the pituitary gland up to 1 cm - Started on Cabergoline 0.25 mg twice weekly since 02/2022 - Check prolactin level today - Patient had good response to cabergoline with decrease in size in adenoma, then in 11/2023, adenoma size tripled with still normal prolactin level  - Pituitary labs were done in 01/2024 which showed normal TFT and IGF-1  - Will rule out cushing with salivary cortisol x2  - Will need to continue to monitor for progression of adenoma size repeat MRI 03/2025 - Discussed with the patient we will likely keep her on cabergoline for 1-2 years and if brain MRI shows stable microadenoma, we will stop cabergoline  # Subclinical hypothyroidism - TSH 2.75 FT4 1.0 02/2024 - No obvious symptoms of hypothyroidism - Repeat TFT today  # 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 - Patient is wondering and would like to proceed with surrogacy with her eggs if possible, so checking anti mullerian hormone  RTC in 6 months. Patient may move to [facility] system for follow up care.   Marisela Hernandez MD Endocrinology, Diabetes and Metabolism [address] [place] [address]. [address] [place] [address] Tel [phone] Fax [phone].

## 2024-07-25 NOTE — HISTORY OF PRESENT ILLNESS
[FreeTextEntry1] : Patient is a 29 F with history of Mullerian agenesis, microprolactinemia, subclinical hypothyroidism here for follow up. Last seen me in 01/2024.  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 H/o Bipolar disease and suicide attempt and had rehab done PSH: Coarctation of the aorta - VSD-ASD-PDA. Cloaca requiring colostomy and then reversal of procedure. In the process of looking for new GI doctor. Soc Hx: No tobacco. Occ alcohol. once/week. Patient is adopted. Currently working as a  at a dentist office. 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 No history of getting any periods in her life. No history of getting hormone replacement therapy. She admits to having normal puberty, height attainment and breast development. 08/26/19: Prolactin 37.3, ACTH 69.4, AM Cortisol 16, HGH 0.07, Androstenedione 252, 17-OHP normal, TFTS normal Pelvic U/S 09/11/19: Congenitally absent uterus but has normal Left and Right ovary. She saw  and had normal chromosomal analysis testing, was normal.  # Microprolactinoma - Previously had blood work which showed 09/13/19: ACTH 70.3, AM Cortisol 19.5, Prolactin 40.6, Monomeric Prolactin 31, 34% macroprolactin, Androstenedione 343 - 11/25/19: MRI Brain showed pituitary gland approx 0.8 cm in height which is normal. Normal posterior pituitary bright spot. Pituitary stalk is centrally located and appears normal. Optic chiasm was normal. Vague area of decreased enhancement was seen involving the right pituitary gland. It measures 0.8 x 0.7 cm in size and could be a vague pituitary microadenoma. Spoke with radiologist who mentioned normal pituitary gland. 07/29/21: Pituitary Gland adenoma minimally changed in size 8 mm in size - Started on Cabergoline 0.25 mg twice weekly since 02/2022 07/08/2022: pituitary adenoma 3 mm (decrease in size from prior) 11/2/2023: pitutiary adenoma right mildly increased in size now up to 11 mm size without optic chiasm contact, slight stalk deviation in the left 3/8/2024: Grossly stable mild enlargement of the pituitary gland up to 1 cm  # Subclinical hypothyroidism TSH elevated at 5.6 with normal FT4 No obvious symptoms of hypothyroidism  # HDL - LDL elevated along with non HDL - Not on medications  9/30/2022 visit: Occ headaches and occasional blurry vision. She has no h/o bone fractures. No headaches. No abdominal striae, no weight gain, no HTN or T2DM. No proximal myopathy. No breast pain or galactorrhea. Does not get periods due to congenitally absent uterus. On cabergoline 0.25 mg twice per week (on Cabergoline since 02/2022). 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 Occasional fatigue.  3/30/2023 visit events: getting a divorce. Getting GI surgery soon. Denies galactorrhea. Denies significant fatigue. Has been feeling whooshing session in her ear and eyes cannot focus. Patient's most recent MRI 11/2/2023 shows: Right pituitary adenoma mildly increased in size comparing back to 11/2019, now up to 11 mm size compared to 8 mm. Without optic chiasm contact. Slight stalk deviation in the left.  01/25/2024 visit events: remains on cabergoline 0.25 mg twice weekly. Denies breast discharge. She is wondering about her potential for surrogacy and fertility. 02/2024 blood work  Prolactin 0.6 AMH 6.71 Cr 0.78,  FT4 1.0 TSH 2.75 IGF-1 163 Estradiol 257 FSH 2.7 LH 4.1  7/25/2024 visit events: remains on cabergoline 0.25 mg twice weekly. Went to hospital for nausea or vomiting, got appendicitis on antibiotics. Will be getting surgery tomorrow at [place].

## 2024-07-31 ENCOUNTER — APPOINTMENT (OUTPATIENT)
Dept: OBGYN | Facility: CLINIC | Age: 29
End: 2024-07-31
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
## 2024-08-23 ENCOUNTER — APPOINTMENT (OUTPATIENT)
Dept: INTERNAL MEDICINE | Facility: CLINIC | Age: 29
End: 2024-08-23

## 2024-11-12 ENCOUNTER — RX RENEWAL (OUTPATIENT)
Age: 29
End: 2024-11-12

## 2025-06-12 ENCOUNTER — APPOINTMENT (OUTPATIENT)
Dept: ENDOCRINOLOGY | Facility: CLINIC | Age: 30
End: 2025-06-12
Payer: COMMERCIAL

## 2025-06-12 VITALS
WEIGHT: 137 LBS | HEIGHT: 64.57 IN | SYSTOLIC BLOOD PRESSURE: 110 MMHG | HEART RATE: 68 BPM | DIASTOLIC BLOOD PRESSURE: 68 MMHG | BODY MASS INDEX: 23.1 KG/M2 | OXYGEN SATURATION: 97 %

## 2025-06-12 PROCEDURE — 99214 OFFICE O/P EST MOD 30 MIN: CPT

## (undated) DEVICE — TUBING SUCTION 20FT

## (undated) DEVICE — TUBING IV SET GRAVITY 3Y 100" MACRO

## (undated) DEVICE — CATH IV SAFE BC 20G X 1.16" (PINK)

## (undated) DEVICE — CATH IV SAFE BC 22G X 1" (BLUE)

## (undated) DEVICE — SYR ALLIANCE II INFLATION 60ML

## (undated) DEVICE — BITE BLOCK ADULT 20 X 27MM (GREEN)

## (undated) DEVICE — SOL INJ NS 0.9% 500ML 2 PORT

## (undated) DEVICE — TUBING SUCTION CONN 6FT STERILE

## (undated) DEVICE — FOLEY HOLDER STATLOCK 2 WAY ADULT

## (undated) DEVICE — SUCTION YANKAUER NO CONTROL VENT

## (undated) DEVICE — PACK IV START WITH CHG

## (undated) DEVICE — SENSOR O2 FINGER ADULT

## (undated) DEVICE — BALLOON US ENDO

## (undated) DEVICE — BIOPSY FORCEP RADIAL JAW 4 STANDARD WITH NEEDLE